# Patient Record
Sex: MALE | Race: WHITE | NOT HISPANIC OR LATINO | Employment: FULL TIME | URBAN - METROPOLITAN AREA
[De-identification: names, ages, dates, MRNs, and addresses within clinical notes are randomized per-mention and may not be internally consistent; named-entity substitution may affect disease eponyms.]

---

## 2020-02-14 ENCOUNTER — APPOINTMENT (OUTPATIENT)
Dept: CT IMAGING | Facility: HOSPITAL | Age: 35
End: 2020-02-14

## 2020-02-14 ENCOUNTER — HOSPITAL ENCOUNTER (EMERGENCY)
Facility: HOSPITAL | Age: 35
Discharge: HOME | End: 2020-02-14
Attending: EMERGENCY MEDICINE | Admitting: EMERGENCY MEDICINE

## 2020-02-14 VITALS
WEIGHT: 208 LBS | SYSTOLIC BLOOD PRESSURE: 120 MMHG | TEMPERATURE: 98.5 F | OXYGEN SATURATION: 97 % | DIASTOLIC BLOOD PRESSURE: 74 MMHG | RESPIRATION RATE: 16 BRPM | BODY MASS INDEX: 28.17 KG/M2 | HEIGHT: 72 IN | HEART RATE: 71 BPM

## 2020-02-14 DIAGNOSIS — K55.9 EPIPLOIC APPENDAGITIS DUE TO ISCHEMIA (HCC): Primary | ICD-10-CM

## 2020-02-14 DIAGNOSIS — K52.9 EPIPLOIC APPENDAGITIS DUE TO ISCHEMIA (HCC): Primary | ICD-10-CM

## 2020-02-14 LAB
ALBUMIN SERPL-MCNC: 4.3 G/DL (ref 3.5–5.2)
ALBUMIN/GLOB SERPL: 1.4 G/DL
ALP SERPL-CCNC: 76 U/L (ref 39–117)
ALT SERPL W P-5'-P-CCNC: 38 U/L (ref 1–41)
ANION GAP SERPL CALCULATED.3IONS-SCNC: 8 MMOL/L (ref 5–15)
AST SERPL-CCNC: 19 U/L (ref 1–40)
BASOPHILS # BLD AUTO: 0.03 10*3/MM3 (ref 0–0.2)
BASOPHILS NFR BLD AUTO: 0.4 % (ref 0–1.5)
BILIRUB SERPL-MCNC: 0.3 MG/DL (ref 0.2–1.2)
BILIRUB UR QL STRIP: NEGATIVE
BUN BLD-MCNC: 8 MG/DL (ref 6–20)
BUN/CREAT SERPL: 8.9 (ref 7–25)
CALCIUM SPEC-SCNC: 9.2 MG/DL (ref 8.6–10.5)
CHLORIDE SERPL-SCNC: 106 MMOL/L (ref 98–107)
CLARITY UR: CLEAR
CO2 SERPL-SCNC: 29 MMOL/L (ref 22–29)
COLOR UR: YELLOW
CREAT BLD-MCNC: 0.9 MG/DL (ref 0.76–1.27)
D-LACTATE SERPL-SCNC: 1 MMOL/L (ref 0.5–2)
DEPRECATED RDW RBC AUTO: 42.2 FL (ref 37–54)
EOSINOPHIL # BLD AUTO: 0.18 10*3/MM3 (ref 0–0.4)
EOSINOPHIL NFR BLD AUTO: 2.5 % (ref 0.3–6.2)
ERYTHROCYTE [DISTWIDTH] IN BLOOD BY AUTOMATED COUNT: 12.5 % (ref 12.3–15.4)
GFR SERPL CREATININE-BSD FRML MDRD: 98 ML/MIN/1.73
GLOBULIN UR ELPH-MCNC: 3.1 GM/DL
GLUCOSE BLD-MCNC: 101 MG/DL (ref 65–99)
GLUCOSE UR STRIP-MCNC: NEGATIVE MG/DL
HCT VFR BLD AUTO: 42.6 % (ref 37.5–51)
HGB BLD-MCNC: 14.7 G/DL (ref 13–17.7)
HGB UR QL STRIP.AUTO: NEGATIVE
HOLD SPECIMEN: NORMAL
HOLD SPECIMEN: NORMAL
IMM GRANULOCYTES # BLD AUTO: 0.02 10*3/MM3 (ref 0–0.05)
IMM GRANULOCYTES NFR BLD AUTO: 0.3 % (ref 0–0.5)
KETONES UR QL STRIP: NEGATIVE
LEUKOCYTE ESTERASE UR QL STRIP.AUTO: NEGATIVE
LIPASE SERPL-CCNC: 39 U/L (ref 13–60)
LYMPHOCYTES # BLD AUTO: 2.15 10*3/MM3 (ref 0.7–3.1)
LYMPHOCYTES NFR BLD AUTO: 29.4 % (ref 19.6–45.3)
MCH RBC QN AUTO: 31.9 PG (ref 26.6–33)
MCHC RBC AUTO-ENTMCNC: 34.5 G/DL (ref 31.5–35.7)
MCV RBC AUTO: 92.4 FL (ref 79–97)
MONOCYTES # BLD AUTO: 0.67 10*3/MM3 (ref 0.1–0.9)
MONOCYTES NFR BLD AUTO: 9.2 % (ref 5–12)
NEUTROPHILS # BLD AUTO: 4.27 10*3/MM3 (ref 1.7–7)
NEUTROPHILS NFR BLD AUTO: 58.2 % (ref 42.7–76)
NITRITE UR QL STRIP: NEGATIVE
NRBC BLD AUTO-RTO: 0 /100 WBC (ref 0–0.2)
PH UR STRIP.AUTO: 8 [PH] (ref 5–8)
PLATELET # BLD AUTO: 210 10*3/MM3 (ref 140–450)
PMV BLD AUTO: 10.5 FL (ref 6–12)
POTASSIUM BLD-SCNC: 4.6 MMOL/L (ref 3.5–5.2)
PROT SERPL-MCNC: 7.4 G/DL (ref 6–8.5)
PROT UR QL STRIP: NEGATIVE
RBC # BLD AUTO: 4.61 10*6/MM3 (ref 4.14–5.8)
SODIUM BLD-SCNC: 143 MMOL/L (ref 136–145)
SP GR UR STRIP: 1.01 (ref 1–1.03)
UROBILINOGEN UR QL STRIP: NORMAL
WBC NRBC COR # BLD: 7.32 10*3/MM3 (ref 3.4–10.8)
WHOLE BLOOD HOLD SPECIMEN: NORMAL
WHOLE BLOOD HOLD SPECIMEN: NORMAL

## 2020-02-14 PROCEDURE — 25010000002 IOPAMIDOL 61 % SOLUTION: Performed by: EMERGENCY MEDICINE

## 2020-02-14 PROCEDURE — 99284 EMERGENCY DEPT VISIT MOD MDM: CPT

## 2020-02-14 PROCEDURE — 83605 ASSAY OF LACTIC ACID: CPT | Performed by: PHYSICIAN ASSISTANT

## 2020-02-14 PROCEDURE — 85025 COMPLETE CBC W/AUTO DIFF WBC: CPT | Performed by: EMERGENCY MEDICINE

## 2020-02-14 PROCEDURE — 0 DIATRIZOATE MEGLUMINE & SODIUM PER 1 ML: Performed by: PHYSICIAN ASSISTANT

## 2020-02-14 PROCEDURE — 74177 CT ABD & PELVIS W/CONTRAST: CPT

## 2020-02-14 PROCEDURE — 83690 ASSAY OF LIPASE: CPT | Performed by: EMERGENCY MEDICINE

## 2020-02-14 PROCEDURE — 81003 URINALYSIS AUTO W/O SCOPE: CPT | Performed by: EMERGENCY MEDICINE

## 2020-02-14 PROCEDURE — 80053 COMPREHEN METABOLIC PANEL: CPT | Performed by: EMERGENCY MEDICINE

## 2020-02-14 RX ORDER — SODIUM CHLORIDE 0.9 % (FLUSH) 0.9 %
10 SYRINGE (ML) INJECTION AS NEEDED
Status: DISCONTINUED | OUTPATIENT
Start: 2020-02-14 | End: 2020-02-14 | Stop reason: HOSPADM

## 2020-02-14 RX ADMIN — IOPAMIDOL 95 ML: 612 INJECTION, SOLUTION INTRAVENOUS at 14:18

## 2020-02-14 RX ADMIN — SODIUM CHLORIDE 1000 ML: 9 INJECTION, SOLUTION INTRAVENOUS at 12:05

## 2020-02-14 RX ADMIN — DIATRIZOATE MEGLUMINE AND DIATRIZOATE SODIUM 15 ML: 660; 100 LIQUID ORAL; RECTAL at 12:28

## 2020-02-14 NOTE — DISCHARGE INSTRUCTIONS
Follow up with one of the [facility] Primary Care Providers below to setup primary care. If you need assistance coordinating a primary care appointment with a [facility] Primary Care Provider, please contact the Primary Care Coordinators at [phone] for appointment scheduling.    [facility], Primary Care   [address], [address]   [place], [place] [address] [phone]    [facility] Internal Medicine & Endocrinology  [address], [address]  [place] [address] [phone]    [facility] Family Medicine  [address], [address]   [place], [place] [address] [phone]    [facility] Primary Care  [address], [address]  [place], [place] [address] [phone]    [facility], Primary Care,   [address], [address]   [place], [place] [address] [phone]    [facility] Primary Care  [address], [address]  [place], [place] [address] [phone]    [facility], Primary Care  [address]  [place], [place] [address] [phone]    [facility] Internal Medicine & Pediatrics  [address], [address]   [place] [address] [phone]    [facility], Primary Care  [address] [place] [address] [phone]      [facility] Primary Care  [address], [address]   [place] [address] [phone]    [facility] Family Medicine  [address] [place] [address] [phone]  Follow up with one of the physician centers below to setup primary care.    [facility], [phone], [address], [address], [place], [facility]    [facility], [phone], [address]  [address]    [facility], [phone], [address] [place] [address];     [facility], [phone], [address] [place] [address]

## 2020-02-14 NOTE — ED PROVIDER NOTES
"Mirna Baez is a 34 y.o.male who presents to the emergency department with complaints of left flank pain. His pain is moderate in severity, constant in timing, and waxing and waning in progression. He states his sharp pain radiates to his suprapubic region and that it feels like \"gas pain\" and that it has lasted for two days. Mr. Baez reports his pain is worse with movement, deep breathing, and twisting. He denies symptoms of coughing, constipation, hematochezia, melena, or smoking. Mr. Baez took Tylenol, but reports no change in pain. He denies ever having a colonoscopy or having any digestive organs removed. He additionally denies history of Chrom's and is unaware of having a family history of diverticulitis. Mr. Baez works at [employer] as a . On Wednesday night, Mr. Baez recently went to a ED in [place] and received a CT scan, IV fluids, Toradol, and was diagnosed with Colitis. He has no past medical or surgical history. He uses smokeless tobacco and drinks alcohol on an occasional basis. He has no additional acute complaints at this time.       History provided by:  Patient  Flank Pain   Pain location:  L flank  Pain quality: sharp    Pain radiates to:  Suprapubic region  Pain severity:  Moderate  Onset quality:  Sudden  Duration:  2 days  Timing:  Constant  Progression:  Waxing and waning  Chronicity:  New  Worsened by:  Movement and deep breathing (Twisting)  Ineffective treatments:  Acetaminophen  Associated symptoms: no constipation, no cough, no hematochezia and no melena        Review of Systems   Respiratory: Negative for cough.    Gastrointestinal: Negative for constipation, hematochezia and melena.   Genitourinary: Positive for flank pain.   All other systems reviewed and are negative.      History reviewed. No pertinent past medical history.    No Known Allergies    History reviewed. No pertinent surgical history.    History reviewed. No pertinent family " history.    Social History     Socioeconomic History   • Marital status:      Spouse name: Not on file   • Number of children: Not on file   • Years of education: Not on file   • Highest education level: Not on file   Tobacco Use   • Smoking status: Never Smoker   • Smokeless tobacco: Current User     Types: Snuff   Substance and Sexual Activity   • Alcohol use: Yes     Frequency: Never     Comment: occasional   • Drug use: Never   • Sexual activity: Yes     Partners: Female         Objective   Physical Exam   Constitutional: He is oriented to person, place, and time. He appears well-developed and well-nourished. No distress.   HENT:   Head: Normocephalic and atraumatic.   Eyes: Conjunctivae are normal. No scleral icterus.   Neck: Normal range of motion. Neck supple.   Cardiovascular: Normal rate and regular rhythm.   No murmur heard.  Pulmonary/Chest: Effort normal and breath sounds normal. No respiratory distress.   Abdominal: Soft. There is tenderness. There is guarding. There is no rebound.   Point tenderness on left lateral abdomen towards ribs.   Musculoskeletal: Normal range of motion. He exhibits no edema.   Neurological: He is alert and oriented to person, place, and time.   Skin: Skin is warm and dry. No rash noted.   No legion or ecchymosis.   Psychiatric: He has a normal mood and affect. His behavior is normal.   Nursing note and vitals reviewed.      Procedures         ED Course  ED Course as of Feb 15 2313   Fri Feb 14, 2020   1223 Diony is at bedside consulting the patient.    [KR]   1451 Request # : [id number]  DEBBIE report was not available.  I believe the medical necessity for and safety in prescribing the controlled substance substantially outweighs the risk of unlawful use or diversion of the controlled substance.     [PORTER]      ED Course User Index  [PORTER] Joseph Luther PA  [KR] Beni Riley     No results found for this or any previous visit (from the past 24 hour(s)).  Note: In  addition to lab results from this visit, the labs listed above may include labs taken at another facility or during a different encounter within the last 24 hours. Please correlate lab times with ED admission and discharge times for further clarification of the services performed during this visit.    CT Abdomen Pelvis With Contrast   Final Result   Pericolonic stranding with a central fat attenuation   surrounded by ring of hyper attenuation adjacent to the anterior margin   descending colon without diverticular disease most consistent with   epiploic appendagitis.        D:  02/14/2020   E:  02/14/2020       This report was finalized on 2/14/2020 7:14 PM by Dr. Rashi Aaron.            Vitals:    02/14/20 1200 02/14/20 1220 02/14/20 1400 02/14/20 1507   BP: 130/81 129/80 122/71 120/74   Pulse:       Resp:       Temp:       TempSrc:       SpO2: 97% 96% 97% 97%   Weight:       Height:         Medications   sodium chloride 0.9 % bolus 1,000 mL (0 mL Intravenous Stopped 2/14/20 1235)   diatrizoate meglumine-sodium (GASTROGRAFIN) 66-10 % solution 15 mL (15 mL Oral Given 2/14/20 1228)   iopamidol (ISOVUE-300) 61 % injection 100 mL (95 mL Intravenous Given 2/14/20 1418)     ECG/EMG Results (last 24 hours)     ** No results found for the last 24 hours. **        No orders to display                     [place] Coma Scale Score: 15                            MDM  Number of Diagnoses or Management Options  Epiploic appendagitis due to ischemia: new and requires workup     Amount and/or Complexity of Data Reviewed  Clinical lab tests: reviewed and ordered  Tests in the radiology section of CPT®: reviewed and ordered  Discuss the patient with other providers: yes    Patient Progress  Patient progress: stable      Final diagnoses:   Epiploic appendagitis due to ischemia       Documentation assistance provided by holly Riley.  Information recorded by the holly was done at my direction and has been verified and  validated by me.     Beni Riley  02/14/20 1214       Joseph Luthre PA  02/15/20 4324

## 2021-02-04 ENCOUNTER — TREATMENT (OUTPATIENT)
Dept: PHYSICAL THERAPY | Facility: CLINIC | Age: 36
End: 2021-02-04

## 2021-02-04 ENCOUNTER — TRANSCRIBE ORDERS (OUTPATIENT)
Dept: PHYSICAL THERAPY | Facility: CLINIC | Age: 36
End: 2021-02-04

## 2021-02-04 DIAGNOSIS — M54.6 PAIN IN THORACIC SPINE: Primary | ICD-10-CM

## 2021-02-04 DIAGNOSIS — S29.019A STRAIN OF THORACIC REGION, INITIAL ENCOUNTER: Primary | ICD-10-CM

## 2021-02-04 PROCEDURE — 97161 PT EVAL LOW COMPLEX 20 MIN: CPT | Performed by: PHYSICAL THERAPIST

## 2021-02-04 PROCEDURE — 97110 THERAPEUTIC EXERCISES: CPT | Performed by: PHYSICAL THERAPIST

## 2021-02-04 PROCEDURE — 97140 MANUAL THERAPY 1/> REGIONS: CPT | Performed by: PHYSICAL THERAPIST

## 2021-02-04 NOTE — PROGRESS NOTES
Physical Therapy Initial Evaluation and Plan of Care      Patient: Rafa Baze   : 1985  Diagnosis/ICD-10 Code:  No primary diagnosis found.  Referring practitioner: Jos Al MD    Subjective Evaluation    History of Present Illness  Date of onset: 2021  Mechanism of injury: Stretching back into extension    Subjective comment: Back locked up while stretching at work in the mid thoracic area.  Had difficulty lifting his arms and moving his trunk.  [place] much better and tried to get back to lifting boxes.  Back got reaggravated and had to stop lifting boxes again.  Denies numbness and tingling in his back and arms.  Denies difficultys sleeping.  Patient Occupation: ->[employer]   Precautions and Work Restrictions: Driving onlyQuality of life: excellent    Pain  Alleviating factors: Ibruprofen; TENS; biofreeze; liodcaine patches.  Exacerbated by: left/right side bending.    Social Support  Lives with: spouse and young children ( 4 kids)    Hand dominance: right    Treatments  Previous treatment: medication (mm relaxer; oral steroid)  Patient Goals  Patient goals for therapy: decreased pain and increased motion             Objective          Active Range of Motion   Cervical/Thoracic Spine     Thoracic   Flexion: WFL  Extension: WFL  Left rotation: 50 (pinching in L lower T/S region) degrees   Right rotation: 60 (pulling in L lower T/S region) degrees     Additional Active Range of Motion Details  GHJ flx:  170 deg B/L  GHJ TED:  WFL B/L  GHJ HBB:  WFL B/L    Strength/Myotome Testing     Additional Strength Details  GHJ scaption MMT:  5/5 B/L  GHJ ER MMT:  5/5 B/L               Assessment & Plan     Assessment  Impairments: abnormal or restricted ROM, lacks appropriate home exercise program, pain with function and safety issue  Assessment details: Mr. Baez is a 35 year old male that presents to physical therapy s/p work related injury on 21 resulting in a mid to lower T/S  injury while stretching.  PMH is unremarkable. No previous history of back pain or injury.  GHJ and C/S cleared.  T/S mobility is limited into rotation bilaterally secondary to soreness in L lower costovertebral junction.  Has signs and symptoms of a rib 9-10 dysfunction that will do very well with thrust manipulation and mobility exercises to allow a pain-free return to premorbid work duties.  Prognosis: good  Functional Limitations: carrying objects, lifting, pulling, pushing and unable to perform repetitive tasks  Goals  Plan Goals: STGs:  1.)  Self report at least 50% improvement in symptoms in 2 weeks.  LTGs:  2.)  Have full trunk rotation at 70 deg bilaterally without symptoms of pulling, catching or pain in 4 weeks.  3.)  Return to all premorbid work related tasks without pain or functional limitation in 4 weeks.    Plan  Therapy options: will be seen for skilled physical therapy services  Planned modality interventions: thermotherapy (hydrocollator packs)  Planned therapy interventions: stretching, strengthening, manual therapy, abdominal trunk stabilization, functional ROM exercises and home exercise program  Frequency: 2x week  Duration in visits: 12  Duration in weeks: 6  Treatment plan discussed with: patient        Manual Therapy:    8     mins  26360;  Therapeutic Exercise:    10     mins  68642;     Neuromuscular Rossi:        mins  10763;    Therapeutic Activity:          mins  76467;     Gait Training:           mins  58572;     Ultrasound:          mins  36487;    Electrical Stimulation:         mins  43743 ( );  Dry Needling          mins self-pay    Timed Treatment:   18   mins   Total Treatment:     45   mins    PT SIGNATURE: Ilya Damon, PT   DATE TREATMENT INITIATED: 2/4/2021    Initial Certification  Certification Period: 5/5/2021  I certify that the therapy services are furnished while this patient is under my care.  The services outlined above are required by this patient, and  will be reviewed every 90 days.     PHYSICIAN: Jos Al MD      DATE:     Please sign and return via fax to [phone].. Thank you, [facility] Physical Therapy.

## 2021-02-09 ENCOUNTER — TREATMENT (OUTPATIENT)
Dept: PHYSICAL THERAPY | Facility: CLINIC | Age: 36
End: 2021-02-09

## 2021-02-09 DIAGNOSIS — M54.6 PAIN IN THORACIC SPINE: Primary | ICD-10-CM

## 2021-02-09 PROCEDURE — 97530 THERAPEUTIC ACTIVITIES: CPT | Performed by: PHYSICAL THERAPIST

## 2021-02-09 PROCEDURE — 97140 MANUAL THERAPY 1/> REGIONS: CPT | Performed by: PHYSICAL THERAPIST

## 2021-02-09 PROCEDURE — 97112 NEUROMUSCULAR REEDUCATION: CPT | Performed by: PHYSICAL THERAPIST

## 2021-02-09 PROCEDURE — 97110 THERAPEUTIC EXERCISES: CPT | Performed by: PHYSICAL THERAPIST

## 2021-02-09 NOTE — PROGRESS NOTES
Physical Therapy Daily Progress Note    Patient: Rafa Baez   : 1985  Diagnosis/ICD-10 Code:  Pain in thoracic spine [M54.6]  Referring practitioner: Jos Al MD  Date of Initial Visit: Type: THERAPY  Noted: 2021  Today's Date: 2021  Patient seen for 2 sessions         Rafa Baez reports that he no longer has catching in his back, but still sharp pain.  Has to take muscle relaxers to sleep at this time.  Has been more sore after last visit, from the exercises.      Subjective     Objective   See Exercise, Manual, and Modality Logs for complete treatment.       Assessment/Plan   Continued emphasis on reducing thoracic stiffness, improving trunk strength and general endurance.         Manual Therapy:    9     mins  13278;  Therapeutic Exercise:    18     mins  09015;     Neuromuscular Rossi:   8    mins  78623;    Therapeutic Activity:     10     mins  50938;     Gait Training:           mins  62147;     Ultrasound:          mins  45504;    Electrical Stimulation:         mins  38840 ( );  Dry Needling          mins self-pay    Timed Treatment:   45   mins   Total Treatment:     45   mins    Ilya Damon PT  Physical Therapist

## 2021-02-18 ENCOUNTER — TREATMENT (OUTPATIENT)
Dept: PHYSICAL THERAPY | Facility: CLINIC | Age: 36
End: 2021-02-18

## 2021-02-18 DIAGNOSIS — M54.6 PAIN IN THORACIC SPINE: Primary | ICD-10-CM

## 2021-02-18 PROCEDURE — 97530 THERAPEUTIC ACTIVITIES: CPT | Performed by: PHYSICAL THERAPIST

## 2021-02-18 PROCEDURE — 97110 THERAPEUTIC EXERCISES: CPT | Performed by: PHYSICAL THERAPIST

## 2021-02-18 PROCEDURE — 97112 NEUROMUSCULAR REEDUCATION: CPT | Performed by: PHYSICAL THERAPIST

## 2021-02-18 PROCEDURE — 97140 MANUAL THERAPY 1/> REGIONS: CPT | Performed by: PHYSICAL THERAPIST

## 2021-02-18 NOTE — PROGRESS NOTES
Physical Therapy Daily Progress Note    Patient: Rafa Baez   : 1985  Diagnosis/ICD-10 Code:  Pain in thoracic spine [M54.6]  Referring practitioner: Jos Al MD  Date of Initial Visit: Type: THERAPY  Noted: 2021  Today's Date: 2021  Patient seen for 3 sessions         Rafa Baez reports feeling no symptoms  of last weekend.  Was cleared to lift up to 45 lbs at work.  This aggravated his symptoms considerably.  Took a muscle relaxer last night to sleep.  Feels better today, but still has stiffness and soreness in his mid back.    Subjective     Objective   See Exercise, Manual, and Modality Logs for complete treatment.       Assessment/Plan  Continued emphasis on reducing trunk stiffness and improving mm activation/intra-abdominal pressure while performing various trunk mobility exercises.  Expect a full recovery with return to work without restrictions in 2 weeks.    HEP:  -long lever trunk rotation in quadruped  -B/L GHJ ER with trunk rotation  -Standing trunk flx with rotation           Manual Therapy:    10     mins  37099;  Therapeutic Exercise:    12     mins  45414;     Neuromuscular Rossi:    9    mins  45354;    Therapeutic Activity:     12     mins  42607;     Gait Training:           mins  54070;     Ultrasound:          mins  59798;    Electrical Stimulation:         mins  78080 ( );  Dry Needling          mins self-pay    Timed Treatment:   43   mins   Total Treatment:     43   mins    Ilya Damon PT  Physical Therapist

## 2021-02-23 ENCOUNTER — TREATMENT (OUTPATIENT)
Dept: PHYSICAL THERAPY | Facility: CLINIC | Age: 36
End: 2021-02-23

## 2021-02-23 DIAGNOSIS — M54.6 PAIN IN THORACIC SPINE: Primary | ICD-10-CM

## 2021-02-23 PROCEDURE — 97140 MANUAL THERAPY 1/> REGIONS: CPT | Performed by: PHYSICAL THERAPIST

## 2021-02-23 PROCEDURE — 97530 THERAPEUTIC ACTIVITIES: CPT | Performed by: PHYSICAL THERAPIST

## 2021-02-23 PROCEDURE — 97110 THERAPEUTIC EXERCISES: CPT | Performed by: PHYSICAL THERAPIST

## 2021-02-23 PROCEDURE — 97112 NEUROMUSCULAR REEDUCATION: CPT | Performed by: PHYSICAL THERAPIST

## 2021-02-23 NOTE — PROGRESS NOTES
Physical Therapy Daily Progress Note    Patient: Rafa Baez   : 1985  Diagnosis/ICD-10 Code:  Pain in thoracic spine [M54.6]  Referring practitioner: Jos Al MD  Date of Initial Visit: Type: THERAPY  Noted: 2021  Today's Date: 2021  Patient seen for 4 sessions         Rafa Baez reports feeling sore for about 3 days after his last visit.  Took a muscle relaxer last night to sleep.  Feels good today.  Just has some stiffness.  Denies soreness.  Reports that he feels like his back lacks stamina as the work week progresses.  Is only lifting 10 lbs max at work.  Decreasing need of use of muscle relaxers since visit.      Subjective     Objective   See Exercise, Manual, and Modality Logs for complete treatment.       Assessment/Plan  Focused visit on reducing mid T/S and adjacent rib stiffness via manual techniques.  Combined with exercises to improve posterior and anterior trunk mm endurance.    Provided written and verbal instruction for updated HEP in terms of dosage and rationale.  (billing included in TE time below)       Manual Therapy:    9     mins  60658;  Therapeutic Exercise:    15     mins  70400;     Neuromuscular Rossi:    9    mins  40709;    Therapeutic Activity:     12     mins  67926;     Gait Training:           mins  75545;     Ultrasound:          mins  79901;    Electrical Stimulation:         mins  05312 ( );  Dry Needling          mins self-pay    Timed Treatment:   45   mins   Total Treatment:     45   mins    Ilya Damon PT  Physical Therapist

## 2021-02-25 ENCOUNTER — TREATMENT (OUTPATIENT)
Dept: PHYSICAL THERAPY | Facility: CLINIC | Age: 36
End: 2021-02-25

## 2021-02-25 DIAGNOSIS — M54.6 PAIN IN THORACIC SPINE: Primary | ICD-10-CM

## 2021-02-25 PROCEDURE — 97530 THERAPEUTIC ACTIVITIES: CPT | Performed by: PHYSICAL THERAPIST

## 2021-02-25 PROCEDURE — 97110 THERAPEUTIC EXERCISES: CPT | Performed by: PHYSICAL THERAPIST

## 2021-02-25 PROCEDURE — 97112 NEUROMUSCULAR REEDUCATION: CPT | Performed by: PHYSICAL THERAPIST

## 2021-02-25 NOTE — PROGRESS NOTES
Physical Therapy Daily Progress Note    Patient: Rafa Baez   : 1985  Diagnosis/ICD-10 Code:  No primary diagnosis found.  Referring practitioner: Jos Al MD  Date of Initial Visit: No linked episodes  Today's Date: 2021  Patient seen for Visit count could not be calculated. Make sure you are using a visit which is associated with an episode. sessions         Rafa Baez reports that he is stiff but not sore.  Only feeling a pinch in the left rib when rotating his body left and right.  Reports that he has been sleeping well the past 2 nights without a muscle relaxer.    Subjective     Objective   See Exercise, Manual, and Modality Logs for complete treatment.       Assessment/Plan  Focused visit on improving trunk strength, functional rotation and general balance/stability.  Will f/u with client next week to see how lifting went at work over the next few days.             Manual Therapy:         mins  24198;  Therapeutic Exercise:    24     mins  44541;     Neuromuscular Rossi:    8    mins  71533;    Therapeutic Activity:     12     mins  94425;     Gait Training:           mins  89533;     Ultrasound:          mins  44706;    Electrical Stimulation:         mins  20626 ( );  Dry Needling          mins self-pay    Timed Treatment:   44   mins   Total Treatment:     44   mins    Ilya Damon PT  Physical Therapist

## 2021-03-02 ENCOUNTER — TREATMENT (OUTPATIENT)
Dept: PHYSICAL THERAPY | Facility: CLINIC | Age: 36
End: 2021-03-02

## 2021-03-02 DIAGNOSIS — M53.84 THORACIC SPINE DYSFUNCTION: Primary | ICD-10-CM

## 2021-03-02 PROCEDURE — 97530 THERAPEUTIC ACTIVITIES: CPT | Performed by: PHYSICAL THERAPIST

## 2021-03-02 PROCEDURE — 97110 THERAPEUTIC EXERCISES: CPT | Performed by: PHYSICAL THERAPIST

## 2021-03-02 PROCEDURE — 97112 NEUROMUSCULAR REEDUCATION: CPT | Performed by: PHYSICAL THERAPIST

## 2021-03-02 NOTE — PROGRESS NOTES
Physical Therapy Daily Progress Note    Patient: Rafa Baez   : 1985  Diagnosis/ICD-10 Code:  No primary diagnosis found.  Referring practitioner: Jos Al MD  Date of Initial Visit: No linked episodes  Today's Date: 3/2/2021  Patient seen for Visit count could not be calculated. Make sure you are using a visit which is associated with an episode. sessions         Rafa Baez reports mainly having stiffness.  States that he has a very little pinch with fully rotating to the left.  Did a lot of work yesterday and felt good during his shift.  States that he feels much better overall.  No need for use of muscle relaxers in 3-4 days.  Sleep is good now.      Subjective     Objective   See Exercise, Manual, and Modality Logs for complete treatment.       Assessment/Plan  Focused visit on reducing T/S stiffness, improving trunk rotation under load and general scapular mm endurance.  Will f/u with client one more visit to assure he is doing well with all work-related tasks.         Manual Therapy:         mins  99443;  Therapeutic Exercise:    12     mins  32933;     Neuromuscular Rossi:    9    mins  44138;    Therapeutic Activity:     15     mins  17701;     Gait Training:           mins  09191;     Ultrasound:          mins  37558;    Electrical Stimulation:         mins  52799 ( );  Dry Needling          mins self-pay    Timed Treatment:   36   mins   Total Treatment:     36 mins    Ilya Damon PT  Physical Therapist

## 2021-05-14 ENCOUNTER — TRANSCRIBE ORDERS (OUTPATIENT)
Dept: PHYSICAL THERAPY | Facility: CLINIC | Age: 36
End: 2021-05-14

## 2021-05-14 DIAGNOSIS — S29.012A STRAIN OF THORACIC BACK REGION: Primary | ICD-10-CM

## 2021-05-28 ENCOUNTER — TREATMENT (OUTPATIENT)
Dept: PHYSICAL THERAPY | Facility: CLINIC | Age: 36
End: 2021-05-28

## 2021-05-28 DIAGNOSIS — M54.6 PAIN IN THORACIC SPINE: Primary | ICD-10-CM

## 2021-05-28 PROCEDURE — 97164 PT RE-EVAL EST PLAN CARE: CPT | Performed by: PHYSICAL THERAPIST

## 2021-05-28 PROCEDURE — 97140 MANUAL THERAPY 1/> REGIONS: CPT | Performed by: PHYSICAL THERAPIST

## 2021-05-28 PROCEDURE — 97110 THERAPEUTIC EXERCISES: CPT | Performed by: PHYSICAL THERAPIST

## 2021-05-28 NOTE — PROGRESS NOTES
Physical Therapy Daily Progress Note/ Re-evaluation    TOTAL TIME:  70 MINUTES    Rafa Baez reports: woke up a couple of weeks ago and felt the same pain- states he woke up in an awkward position, twisted ; had some x rays that were negative; got a massage yesterday- didn't help at all; feels stiff and won't loosen up; now I feel like the whole back is stiff , hamstrings get really tight ; limited with ADL's  Working on light duty, only doing very light packages  F/u with occ med in a couple of weeks   Pain is constant 2/10; patient feels pain is not affected by posture, 'whether I sit with good posture or bad posture, when I sit for too long the pain gets worse'  Does tens unit ~ 2x per day  Uses some lidocaine patches by icy hot     Pain with rotation, mainly to the left ; feels good to stretch to the right with rotation   Is performing stretching routine at home from previous PT sessions  Feels better after stretching, just does not last, tightness comes back    Objective   See Exercise, Manual, and Modality Logs for complete treatment.     ROM: full ROM of T/L spine today, normal mm tightness at end range, no pain    Palpation: no pain with palpation, but location of pain throughout day is ~ T9-11 left sided, no tenderness to spinous processes    THERAPEUTIC EXERCISES/ACTIVITIES ADDED TODAY: see below     Access Code: [email or web address]  URL: [email or web address]  Date: 05/28/2021  Prepared by: Mike Gary    Exercises  Supine Thoracic Extension on Swiss Ball - 3 x daily - 7 x weekly - 1 sets - 10 reps  Child's Pose Stretch - 3 x daily - 7 x weekly - 1 sets - 10 reps  Child's Pose with Sidebending - 3 x daily - 7 x weekly - 1 sets - 10 reps  Child's Pose with Thread the Needle - 3 x daily - 7 x weekly - 1 sets - 10 reps  Downward Dog - 3 x daily - 7 x weekly - 1 sets - 5 reps - 10 hold  Standing Quadratus Lumborum Stretch with Doorway - 3 x daily - 7 x weekly - 1 sets - 5 reps - 10 hold  TL Sidebending  Stretch - Arms Overhead - 3 x daily - 7 x weekly - 1 sets - 5 reps - 10 hold  Supine Hamstring Stretch with Doorway - 3 x daily - 7 x weekly - 1 sets - 5 reps - 10 hold  Doorway Rhomboid Stretch - 3 x daily - 7 x weekly - 1 sets - 5 reps - 10 hold    Moist heat with IFC estim to left p/s mm thoracic spine x 15 minutes     Manual: thoracic STM, deep sustained pressure to tender/trigger points in rhomboid mm, mid traps left side     Thoracic mobilizations into extension, seated on edge of table / thoracic PA's in prone    Assessment/Plan  Patient presents to PT with similar pain / symptoms that he had in Feb/March; was last seen on 3/2/21 and was doing better at that time; c/o primarily tight mm that 'can't get stretched out' ; he responded well to today's treatment, was pleased with full, pain-free ROM at end of session; PT for manual therapy, therapeutic exercise, taping, modalities prn for relief of pain and improved mobility    Progress per Plan of Care and Progress strengthening /stabilization /functional activity           Manual Therapy:    25     mins  11068;  Therapeutic Exercise:    10     mins  00972;     Neuromuscular Rossi:        mins  11077;    Therapeutic Activity:          mins  33861;     Gait Training:           mins  86003;     Ultrasound:          mins  23223;    Electrical Stimulation:    15     mins  69251 ( );  Dry Needling          mins self-pay    Timed Treatment:   50   mins   Total Treatment:     70   mins    TRAVIS Gary, PT  Physical Therapist

## 2021-06-02 ENCOUNTER — TREATMENT (OUTPATIENT)
Dept: PHYSICAL THERAPY | Facility: CLINIC | Age: 36
End: 2021-06-02

## 2021-06-02 DIAGNOSIS — M54.6 PAIN IN THORACIC SPINE: Primary | ICD-10-CM

## 2021-06-02 PROCEDURE — 97140 MANUAL THERAPY 1/> REGIONS: CPT | Performed by: PHYSICAL THERAPIST

## 2021-06-02 PROCEDURE — 97014 ELECTRIC STIMULATION THERAPY: CPT | Performed by: PHYSICAL THERAPIST

## 2021-06-02 PROCEDURE — 97110 THERAPEUTIC EXERCISES: CPT | Performed by: PHYSICAL THERAPIST

## 2021-06-02 NOTE — PROGRESS NOTES
Physical Therapy Daily Progress Note    TOTAL TIME: 55 MINUTES    Rafa Baez reports: felt a lot better after the last session, no pain last couple of days      Objective   See Exercise, Manual, and Modality Logs for complete treatment.     THERAPEUTIC EXERCISES/ACTIVITIES ADDED TODAY: see hep    Manual: STM to left periscapular mm, rhomboids, lower trapezius, mobilization to CT jxn in seated position x 25 minutes     IFC with moist heat to left periscapular mm x 15 minutes    Assessment/Plan  PATIENT NEEDS CONTINUED PHYSICAL THERAPY IN ORDER TO ACHIEVE FULL ROM, STRENGTH AND FUNCTION WITH NO REPORTS OF PAIN IN ORDER TO RTW WITHOUT RESTRICTIONS AND WITHOUT PAIN OR DYSFUNCTION       Progress per Plan of Care           Manual Therapy:    25     mins  03473;  Therapeutic Exercise:    15     mins  38755;     Neuromuscular Rossi:        mins  54787;    Therapeutic Activity:          mins  08507;     Gait Training:           mins  81368;     Ultrasound:          mins  42372;    Electrical Stimulation:    15     mins  79012 ( );  Dry Needling          mins self-pay    Timed Treatment:   55   mins   Total Treatment:     55   mins    TRAVIS Gary PT  Physical Therapist

## 2021-06-08 ENCOUNTER — TREATMENT (OUTPATIENT)
Dept: PHYSICAL THERAPY | Facility: CLINIC | Age: 36
End: 2021-06-08

## 2021-06-08 DIAGNOSIS — M54.6 PAIN IN THORACIC SPINE: Primary | ICD-10-CM

## 2021-06-08 PROCEDURE — 97112 NEUROMUSCULAR REEDUCATION: CPT | Performed by: PHYSICAL THERAPIST

## 2021-06-08 PROCEDURE — 97110 THERAPEUTIC EXERCISES: CPT | Performed by: PHYSICAL THERAPIST

## 2021-06-08 PROCEDURE — 97140 MANUAL THERAPY 1/> REGIONS: CPT | Performed by: PHYSICAL THERAPIST

## 2021-06-08 PROCEDURE — 97014 ELECTRIC STIMULATION THERAPY: CPT | Performed by: PHYSICAL THERAPIST

## 2021-06-08 NOTE — PROGRESS NOTES
Physical Therapy Daily Progress Note    TOTAL TIME: 55 MINUTES    Rafa Baez reports: feels much better; don't really have any pain, just that one tiny spot that feels tight      Objective   See Exercise, Manual, and Modality Logs for complete treatment.     THERAPEUTIC EXERCISES/ACTIVITIES ADDED TODAY: open book thoracic stx, TRX thoracic/lat stx bilateral and unilateral     Manual: STM to left periscapular mm, rhomboids, middle trap; thoracic mobilizations in seated position x 10 minutes    Moist heat with estim to left periscapular mm x 15 minutes     Assessment/Plan  PATIENT NEEDS CONTINUED PHYSICAL THERAPY IN ORDER TO ACHIEVE FULL ROM, STRENGTH AND FUNCTION WITH NO REPORTS OF PAIN IN ORDER TO RTW WITHOUT RESTRICTIONS AND WITHOUT PAIN OR DYSFUNCTION ; INSTRUCTED PATIENT THAT CONTINUAL SELF SOFT-TISSUE WORK, MOBILIZATION, STRETCHING ETC WILL BE NECESSARY TO HAVE A GOOD CHANCE AT DECREASING LIKELIHOOD OF CONTINUE THORACIC TIGHTNESS WHILE WORKING, LIFTING/CARRYING HEAVY BOXES; ERGONOMIC EDUCATION FOR LIFTING/CARRYING HEAVY OR AWKWARD BOXES CLOSE TO BODY       Progress per Plan of Care and Progress strengthening /stabilization /functional activity           Manual Therapy:    10     mins  56445;  Therapeutic Exercise:    15     mins  02566;     Neuromuscular Rossi:    15    mins  05365;    Therapeutic Activity:          mins  39744;     Gait Training:           mins  50265;     Ultrasound:          mins  34795;    Electrical Stimulation:    15     mins  36544 ( );  Dry Needling          mins self-pay    Timed Treatment:   55   mins   Total Treatment:     55   mins    TRAVIS Gary PT  Physical Therapist

## 2021-06-10 ENCOUNTER — TREATMENT (OUTPATIENT)
Dept: PHYSICAL THERAPY | Facility: CLINIC | Age: 36
End: 2021-06-10

## 2021-06-10 DIAGNOSIS — M54.6 PAIN IN THORACIC SPINE: Primary | ICD-10-CM

## 2021-06-10 PROCEDURE — 97112 NEUROMUSCULAR REEDUCATION: CPT | Performed by: PHYSICAL THERAPIST

## 2021-06-10 PROCEDURE — 97140 MANUAL THERAPY 1/> REGIONS: CPT | Performed by: PHYSICAL THERAPIST

## 2021-06-10 PROCEDURE — 97014 ELECTRIC STIMULATION THERAPY: CPT | Performed by: PHYSICAL THERAPIST

## 2021-06-10 PROCEDURE — 97110 THERAPEUTIC EXERCISES: CPT | Performed by: PHYSICAL THERAPIST

## 2021-06-10 NOTE — PROGRESS NOTES
Physical Therapy Daily Progress Note    TOTAL TIME: 60 MINUTES    Rafa Baez reports: patient states he actually worked 9 1/2 hours yesterday, but did not have to carry of lift anything heavy; feels stiff this am but no pain      Objective   See Exercise, Manual, and Modality Logs for complete treatment.     THERAPEUTIC EXERCISES/ACTIVITIES ADDED TODAY: TG hangs, TRX thoracic mobilizations    Manual: thoracic mobilizations, STM to left periscapular mm x 15 minutes    Assessment/Plan  PATIENT NEEDS CONTINUED PHYSICAL THERAPY IN ORDER TO ACHIEVE FULL ROM, STRENGTH AND FUNCTION WITH NO REPORTS OF PAIN IN ORDER TO RTW WITHOUT RESTRICTIONS AND WITHOUT PAIN OR DYSFUNCTION       Progress per Plan of Care           Manual Therapy:    15     mins  82026;  Therapeutic Exercise:    15     mins  13309;     Neuromuscular Rossi:    15    mins  54905;    Therapeutic Activity:          mins  70443;     Gait Training:           mins  86906;     Ultrasound:          mins  12979;    Electrical Stimulation:    15     mins  27153 ( );  Dry Needling          mins self-pay    Timed Treatment:   60   mins   Total Treatment:     60   mins    TRAVIS Gary PT  Physical Therapist

## 2021-06-16 ENCOUNTER — TREATMENT (OUTPATIENT)
Dept: PHYSICAL THERAPY | Facility: CLINIC | Age: 36
End: 2021-06-16

## 2021-06-16 DIAGNOSIS — M54.6 PAIN IN THORACIC SPINE: Primary | ICD-10-CM

## 2021-06-16 PROCEDURE — 97535 SELF CARE MNGMENT TRAINING: CPT | Performed by: PHYSICAL THERAPIST

## 2021-06-16 PROCEDURE — 97140 MANUAL THERAPY 1/> REGIONS: CPT | Performed by: PHYSICAL THERAPIST

## 2021-06-16 PROCEDURE — 97014 ELECTRIC STIMULATION THERAPY: CPT | Performed by: PHYSICAL THERAPIST

## 2021-06-16 NOTE — PROGRESS NOTES
Physical Therapy Daily Progress Note/ Discharge Summary    TOTAL TIME: 35 MINUTES    Rafa Hudsontner reports: feels 100%; seeing MD after this, hoping for full release; no tender spots, using thera gun, thera cane, tens unit at home to keep soft tissue restrictions away      Objective   See Exercise, Manual, and Modality Logs for complete treatment.     THERAPEUTIC EXERCISES/ACTIVITIES ADDED TODAY: see flow sheets   IFC with moist heat to left periscapular mm x 15 minutes    Assessment/Plan  Patient has made very good progress and is pain-free; ready to RTW with all goals met    Other discharge  Self care/home mgmt: extensive education regarding maintenance of soft tissue mobility, posture, ergonomics, self-treat techniques       Self care home mgtm   10 mins    [id number]  Manual Therapy:    10     mins  90129;  Therapeutic Exercise:         mins  24188;     Neuromuscular Rossi:        mins  62420;    Therapeutic Activity:          mins  45242;     Gait Training:           mins  10681;     Ultrasound:          mins  26577;    Electrical Stimulation:    15     mins  36006 ( );  Dry Needling          mins self-pay    Timed Treatment:   35   mins   Total Treatment:     35   mins    TRAVIS Gary PT  Physical Therapist

## 2022-08-09 ENCOUNTER — HOSPITAL ENCOUNTER (EMERGENCY)
Facility: HOSPITAL | Age: 37
Discharge: HOME | End: 2022-08-09
Attending: EMERGENCY MEDICINE | Admitting: EMERGENCY MEDICINE

## 2022-08-09 ENCOUNTER — APPOINTMENT (OUTPATIENT)
Dept: CT IMAGING | Facility: HOSPITAL | Age: 37
End: 2022-08-09

## 2022-08-09 VITALS
HEIGHT: 72 IN | BODY MASS INDEX: 30.2 KG/M2 | RESPIRATION RATE: 18 BRPM | DIASTOLIC BLOOD PRESSURE: 72 MMHG | OXYGEN SATURATION: 94 % | TEMPERATURE: 98.8 F | SYSTOLIC BLOOD PRESSURE: 129 MMHG | HEART RATE: 75 BPM | WEIGHT: 223 LBS

## 2022-08-09 DIAGNOSIS — R51.9 NONINTRACTABLE HEADACHE, UNSPECIFIED CHRONICITY PATTERN, UNSPECIFIED HEADACHE TYPE: Primary | ICD-10-CM

## 2022-08-09 LAB
FLUAV RNA RESP QL NAA+PROBE: NOT DETECTED
FLUBV RNA RESP QL NAA+PROBE: NOT DETECTED
HOLD SPECIMEN: NORMAL
SARS-COV-2 RNA RESP QL NAA+PROBE: NOT DETECTED
WHOLE BLOOD HOLD COAG: NORMAL
WHOLE BLOOD HOLD SPECIMEN: NORMAL

## 2022-08-09 PROCEDURE — 70450 CT HEAD/BRAIN W/O DYE: CPT

## 2022-08-09 PROCEDURE — 25010000002 PROCHLORPERAZINE 10 MG/2ML SOLUTION: Performed by: NURSE PRACTITIONER

## 2022-08-09 PROCEDURE — 87636 SARSCOV2 & INF A&B AMP PRB: CPT | Performed by: NURSE PRACTITIONER

## 2022-08-09 PROCEDURE — 96374 THER/PROPH/DIAG INJ IV PUSH: CPT

## 2022-08-09 PROCEDURE — 25010000002 DIPHENHYDRAMINE PER 50 MG: Performed by: NURSE PRACTITIONER

## 2022-08-09 PROCEDURE — 25010000002 KETOROLAC TROMETHAMINE PER 15 MG: Performed by: NURSE PRACTITIONER

## 2022-08-09 PROCEDURE — 99283 EMERGENCY DEPT VISIT LOW MDM: CPT

## 2022-08-09 PROCEDURE — 96375 TX/PRO/DX INJ NEW DRUG ADDON: CPT

## 2022-08-09 RX ORDER — DIPHENHYDRAMINE HYDROCHLORIDE 50 MG/ML
25 INJECTION INTRAMUSCULAR; INTRAVENOUS ONCE
Status: COMPLETED | OUTPATIENT
Start: 2022-08-09 | End: 2022-08-09

## 2022-08-09 RX ORDER — SODIUM CHLORIDE 0.9 % (FLUSH) 0.9 %
10 SYRINGE (ML) INJECTION AS NEEDED
Status: DISCONTINUED | OUTPATIENT
Start: 2022-08-09 | End: 2022-08-09

## 2022-08-09 RX ORDER — SODIUM CHLORIDE 0.9 % (FLUSH) 0.9 %
10 SYRINGE (ML) INJECTION AS NEEDED
Status: DISCONTINUED | OUTPATIENT
Start: 2022-08-09 | End: 2022-08-09 | Stop reason: HOSPADM

## 2022-08-09 RX ORDER — KETOROLAC TROMETHAMINE 15 MG/ML
15 INJECTION, SOLUTION INTRAMUSCULAR; INTRAVENOUS ONCE
Status: COMPLETED | OUTPATIENT
Start: 2022-08-09 | End: 2022-08-09

## 2022-08-09 RX ORDER — PROCHLORPERAZINE EDISYLATE 5 MG/ML
5 INJECTION INTRAMUSCULAR; INTRAVENOUS ONCE
Status: COMPLETED | OUTPATIENT
Start: 2022-08-09 | End: 2022-08-09

## 2022-08-09 RX ADMIN — SODIUM CHLORIDE 1000 ML: 9 INJECTION, SOLUTION INTRAVENOUS at 10:28

## 2022-08-09 RX ADMIN — KETOROLAC TROMETHAMINE 15 MG: 15 INJECTION, SOLUTION INTRAMUSCULAR; INTRAVENOUS at 10:32

## 2022-08-09 RX ADMIN — PROCHLORPERAZINE EDISYLATE 5 MG: 5 INJECTION INTRAMUSCULAR; INTRAVENOUS at 10:30

## 2022-08-09 RX ADMIN — DIPHENHYDRAMINE HYDROCHLORIDE 25 MG: 50 INJECTION INTRAMUSCULAR; INTRAVENOUS at 10:29

## 2022-08-09 NOTE — ED PROVIDER NOTES
Subjective   Rafa Baez is a 37 yr old male that presents to the ER with complaints of a headache.  Patient reports that he has had head pain all over his head.  Pain started around 4 PM yesterday and's been constant.  He denies any fevers or chills.  Negative for nausea or vomiting.  Patient reports that he is sensitive to light.  He complains of dizziness.  Pain increases with movement of his eyes.      History provided by:  Patient   used: No    Headache  Pain location:  Generalized  Radiates to:  Does not radiate  Severity at highest:  8/10  Duration:  1 day  Timing:  Constant  Progression:  Worsening  Context: activity, bright light and loud noise    Associated symptoms: dizziness and photophobia    Associated symptoms: no fever, no nausea, no numbness, no tingling and no vomiting        Review of Systems   Constitutional: Negative for fever.   Eyes: Positive for photophobia.   Gastrointestinal: Negative for nausea and vomiting.   Neurological: Positive for dizziness and headaches. Negative for numbness.   All other systems reviewed and are negative.      No past medical history on file.    No Known Allergies    No past surgical history on file.    No family history on file.    Social History     Socioeconomic History   • Marital status:    Tobacco Use   • Smoking status: Never Smoker   • Smokeless tobacco: Current User     Types: Snuff   Substance and Sexual Activity   • Alcohol use: Yes     Comment: occasional   • Drug use: Never   • Sexual activity: Yes     Partners: Female           Objective   Physical Exam  Vitals and nursing note reviewed.   Constitutional:       General: He is in acute distress.      Appearance: Normal appearance. He is well-developed. He is not toxic-appearing.      Comments: Patient is lying in a dark room.  Hand over his head.   HENT:      Head: Normocephalic and atraumatic.      Nose: Nose normal.      Mouth/Throat:      Mouth: Mucous membranes are  moist.   Eyes:      General: Lids are normal.      Extraocular Movements: Extraocular movements intact.      Conjunctiva/sclera: Conjunctivae normal.      Pupils: Pupils are equal, round, and reactive to light.   Neck:      Trachea: Trachea normal.   Cardiovascular:      Rate and Rhythm: Regular rhythm.      Pulses: Normal pulses.      Heart sounds: Normal heart sounds.   Pulmonary:      Effort: Pulmonary effort is normal. No respiratory distress.      Breath sounds: Normal breath sounds. No decreased breath sounds, wheezing, rhonchi or rales.   Abdominal:      General: Bowel sounds are normal.      Palpations: Abdomen is soft.      Tenderness: There is no abdominal tenderness.   Musculoskeletal:         General: Normal range of motion.      Cervical back: Full passive range of motion without pain and normal range of motion.   Skin:     General: Skin is warm and dry.      Findings: No rash.   Neurological:      Mental Status: He is alert and oriented to person, place, and time.      Cranial Nerves: No cranial nerve deficit.   Psychiatric:         Speech: Speech normal.         Behavior: Behavior normal. Behavior is cooperative.         Procedures           ED Course  ED Course as of 08/09/22 1152   Tue Aug 09, 2022   1119 Patient is feeling much better at this time.  Patient will be discharged home.  Patient to follow-up with primary care physician.  Patient agrees with treatment plan and verbalized understanding. [KG]      ED Course User Index  [KG] Pricila Butcher, APRN           Recent Results (from the past 24 hour(s))   COVID-19 and FLU A/B PCR - Swab, Nasopharynx    Collection Time: 08/09/22  9:37 AM    Specimen: Nasopharynx; Swab   Result Value Ref Range    COVID19 Not Detected Not Detected - Ref. Range    Influenza A PCR Not Detected Not Detected    Influenza B PCR Not Detected Not Detected   Green Top (Gel)    Collection Time: 08/09/22 10:28 AM   Result Value Ref Range    Extra Tube Hold for add-ons.   "  Lavender Top    Collection Time: 08/09/22 10:28 AM   Result Value Ref Range    Extra Tube hold for add-on    Gold Top - SST    Collection Time: 08/09/22 10:28 AM   Result Value Ref Range    Extra Tube Hold for add-ons.    Light Blue Top    Collection Time: 08/09/22 10:28 AM   Result Value Ref Range    Extra Tube Hold for add-ons.      Note: In addition to lab results from this visit, the labs listed above may include labs taken at another facility or during a different encounter within the last 24 hours. Please correlate lab times with ED admission and discharge times for further clarification of the services performed during this visit.    CT Head Without Contrast   Final Result   No evidence of hemorrhage, mass effect or midline shift. No acute   process identified.       This report was finalized on 8/9/2022 9:58 AM by Letitia Robison MD.            Vitals:    08/09/22 0911 08/09/22 1000 08/09/22 1100   BP: 134/83 113/64 129/72   BP Location: Left arm     Patient Position: Sitting     Pulse: 94 75    Resp: 18 18    Temp: 98.8 °F (37.1 °C)     TempSrc: Oral     SpO2: 96% 95% 94%   Weight: 101 kg (223 lb)     Height: 182.9 cm (72\")       Medications   sodium chloride 0.9 % flush 10 mL (has no administration in time range)   sodium chloride 0.9 % flush 10 mL (has no administration in time range)   sodium chloride 0.9 % bolus 1,000 mL (0 mL Intravenous Stopped 8/9/22 1124)   ketorolac (TORADOL) injection 15 mg (15 mg Intravenous Given 8/9/22 1032)   prochlorperazine (COMPAZINE) injection 5 mg (5 mg Intravenous Given 8/9/22 1030)   diphenhydrAMINE (BENADRYL) injection 25 mg (25 mg Intravenous Given 8/9/22 1029)     ECG/EMG Results (last 24 hours)     ** No results found for the last 24 hours. **        No orders to display                                       MDM    Final diagnoses:   Nonintractable headache, unspecified chronicity pattern, unspecified headache type       ED Disposition  ED Disposition     ED " Disposition   Discharge    Condition   Stable    Comment   --             [facility] Emergency Department  [address] [address] [phone]    As needed         Medication List      Stop    diclofenac 50 MG EC tablet  Commonly known as: Pricila Mota, HOPE  08/09/22 1159

## 2022-08-09 NOTE — DISCHARGE INSTRUCTIONS
Go home rest.  He continued to have issues follow-up with primary care physician.  Return to the ER.

## 2022-08-31 ENCOUNTER — OFFICE VISIT (OUTPATIENT)
Dept: NEUROLOGY | Facility: CLINIC | Age: 37
End: 2022-08-31

## 2022-08-31 VITALS
HEART RATE: 87 BPM | DIASTOLIC BLOOD PRESSURE: 78 MMHG | BODY MASS INDEX: 30.2 KG/M2 | SYSTOLIC BLOOD PRESSURE: 122 MMHG | HEIGHT: 72 IN | WEIGHT: 223 LBS | OXYGEN SATURATION: 97 %

## 2022-08-31 DIAGNOSIS — G43.509 PERSISTENT MIGRAINE AURA WITHOUT CEREBRAL INFARCTION AND WITHOUT STATUS MIGRAINOSUS, NOT INTRACTABLE: Primary | ICD-10-CM

## 2022-08-31 DIAGNOSIS — M54.2 NECK PAIN: ICD-10-CM

## 2022-08-31 PROCEDURE — 99204 OFFICE O/P NEW MOD 45 MIN: CPT | Performed by: NURSE PRACTITIONER

## 2022-08-31 RX ORDER — AMITRIPTYLINE HYDROCHLORIDE 10 MG/1
10-20 TABLET, FILM COATED ORAL NIGHTLY
Qty: 60 TABLET | Refills: 3 | Status: SHIPPED | OUTPATIENT
Start: 2022-08-31 | End: 2022-11-30

## 2022-08-31 RX ORDER — SUMATRIPTAN 50 MG/1
TABLET, FILM COATED ORAL
COMMUNITY
Start: 2022-08-18

## 2022-08-31 RX ORDER — SERTRALINE HYDROCHLORIDE 100 MG/1
100 TABLET, FILM COATED ORAL DAILY
COMMUNITY

## 2022-08-31 NOTE — PROGRESS NOTES
"   Neuro Office Visit      Encounter Date: 2022   Patient Name: Rafa Baez  : 1985   MRN: [id number]   PCP:  Provider, No Known     Chief Complaint:    Chief Complaint   Patient presents with   • Migraines     NP- constant pressure       History of Present Illness: Rafa Baez is a 37 y.o. male who is here today in Neurology for headaches.     He was seen in the ED at [facility] on 2022 for migraine headache. H/A resolved with administration of Toradol, Compazine, and Benadryl. Started on  and kept building and building. Developed nausea and dizziness. Triptans have not been effective for him. Also has \"knots in neck\". Wife massaged his neck which gave him some relief.    Headache Symptoms:   Days per month: Constant since   Location: Holocranial and Occiput       Quality: Pressure        Duration: Constant unless doing something strenuous  Severity: 3-5 but occasionally up to 10  Triggers: Jarring motions, shaking head fast  Associated Symptoms: Nausea, Vomiting, Photophobia, Phonophobia and Dizziness  Aura: Pressure sensation in occiput  Visual Changes: Blurred vision, black spots lasting 5-10 minutes eases off when lying down    Abortives: Sumatriptan  Preventives: Massage therapy                                                                              Previous Treatments: Triptans,  Excedrin Migraine, Ibuprofen    Subjective          Past Medical History:   Past Medical History:   Diagnosis Date   • Anxiety    • Bipolar 1 disorder (HCC)    • Depression    • Migraine    • PTSD (post-traumatic stress disorder)    • TBI (traumatic brain injury) (HCC)        Past Surgical History: History reviewed. No pertinent surgical history.    Family History:   Family History   Problem Relation Age of Onset   • Seizures Mother        Social History:   Social History     Socioeconomic History   • Marital status:    Tobacco Use   • Smoking status: Never Smoker   • Smokeless tobacco: Current User " "    Types: Snuff   Vaping Use   • Vaping Use: Never used   Substance and Sexual Activity   • Alcohol use: Yes     Comment: occasional   • Drug use: Never   • Sexual activity: Yes     Partners: Female       Medications:     Current Outpatient Medications:   •  sertraline (ZOLOFT) 100 MG tablet, Take 100 mg by mouth Daily., Disp: , Rfl:   •  SUMAtriptan (IMITREX) 50 MG tablet, TAKE TABLETS BY MOUTH AS DIRECTED TAKE 1 TABLET AT HEADACHE ONSET, MAY REPEAT IN TWO HOURS IF NEEDED, Disp: , Rfl:   •  amitriptyline (ELAVIL) 10 MG tablet, Take 1-2 tablets by mouth Every Night., Disp: 60 tablet, Rfl: 3    Allergies:   No Known Allergies    PHQ-9 Total Score:     STEADI Fall Risk Assessment has not been completed.    Objective     Physical Exam:   Physical Exam  Musculoskeletal:      Cervical back: Tenderness (Occipital area) present.   Neurological:      Mental Status: He is oriented to person, place, and time.      Gait: Gait is intact.      Deep Tendon Reflexes: Strength normal.         Neurologic Exam     Mental Status   Oriented to person, place, and time.   Level of consciousness: alert    Motor Exam   Muscle bulk: normal  Overall muscle tone: normal    Strength   Strength 5/5 throughout.     Sensory Exam   Light touch normal.     Gait, Coordination, and Reflexes     Gait  Gait: normal       Vital Signs:   Vitals:    08/31/22 0947   BP: 122/78   Pulse: 87   SpO2: 97%   Weight: 101 kg (223 lb)   Height: 182.9 cm (72.01\")     Body mass index is 30.24 kg/m².     Results:   Imaging:   CT Head Without Contrast    Result Date: 8/9/2022  No evidence of hemorrhage, mass effect or midline shift. No acute process identified.  This report was finalized on 8/9/2022 9:58 AM by Letitia Robison MD.         Labs:    No results found for: CMP, PROTEIN, ANTIMOGAB, [id number], JCVRESULT, QUANTTBGOLD, CBCDIF, IGGALBSER     Assessment / Plan      Assessment/Plan:   Diagnoses and all orders for this visit:    1. 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 Neck pain  Comments:  Physical therapy to evaluate and treat    Other orders  -     amitriptyline (ELAVIL) 10 MG tablet; Take 1-2 tablets by mouth Every Night.  Dispense: 60 tablet; Refill: 3           Patient Education:   I have discussed with the patient today the causes and overview of headaches. We discussed the different types of headaches to include tension-type headaches, Migraine headaches and Cluster headaches. We also discussed when headaches could or would be of a more serious condition such as brain infection, inflammation or bleeding within or around the brain. When to seek immediate medical attention or call 911.       Follow Up:   Return in about 3 months (around 11/30/2022).    During this visit the following were done:  Labs Reviewed []    Labs Ordered []    Radiology Reports Reviewed [x]    Radiology Ordered []    PCP Records Reviewed []    Referring Provider Records Reviewed []    ER Records Reviewed []    Hospital Records Reviewed []    History Obtained From Family []    Radiology Images Reviewed []    Other Reviewed []    Records Requested []      Time Spent: 40 minutes    HOPE Gao  [facility] NEUR[facility]  [address]  [place] [address] [phone]

## 2022-09-02 ENCOUNTER — SPECIALTY PHARMACY (OUTPATIENT)
Dept: ONCOLOGY | Facility: HOSPITAL | Age: 37
End: 2022-09-02

## 2022-09-02 ENCOUNTER — DOCUMENTATION (OUTPATIENT)
Dept: PHARMACY | Facility: HOSPITAL | Age: 37
End: 2022-09-02

## 2022-09-02 PROBLEM — G43.509: Status: ACTIVE | Noted: 2022-09-02

## 2022-09-02 RX ORDER — RIMEGEPANT SULFATE 75 MG/75MG
75 TABLET, ORALLY DISINTEGRATING ORAL DAILY PRN
Qty: 8 TABLET | Refills: 11 | Status: SHIPPED | OUTPATIENT
Start: 2022-09-02

## 2022-09-02 NOTE — PROGRESS NOTES
Specialty Pharmacy Patient Management Program  Neurology Initial Assessment     Rafa Baez is a 37 y.o. male with migraines seen by a Neurology provider and enrolled in the Neurology Patient Management program offered by [facility].  An initial outreach was conducted, including assessment of therapy appropriateness and specialty medication education for [facility]. The patient was introduced to services offered by [facility], including: regular assessments, refill coordination, curbside pick-up or mail order delivery options, prior authorization maintenance, and financial assistance programs as applicable. The patient was also provided with contact information for the pharmacy team.     Insurance Coverage & Financial Support  [facility]    Relevant Past Medical History and Comorbidities  Relevant medical history and concomitant health conditions were discussed with the patient. The patient's chart has been reviewed for relevant past medical history and comorbid health conditions and updated as necessary.   Past Medical History:   Diagnosis Date   • Anxiety    • Bipolar 1 disorder (HCC)    • Depression    • Migraine    • PTSD (post-traumatic stress disorder)    • TBI (traumatic brain injury) (HCC)      Social History     Socioeconomic History   • Marital status:    Tobacco Use   • Smoking status: Never Smoker   • Smokeless tobacco: Current User     Types: Snuff   Vaping Use   • Vaping Use: Never used   Substance and Sexual Activity   • Alcohol use: Yes     Comment: occasional   • Drug use: Never   • Sexual activity: Yes     Partners: Female       Problem list reviewed by Jeannie Sloan, PharmD on 9/2/2022 at 10:44 AM    Allergies  Known allergies and reactions were discussed with the patient. The patient's chart has been reviewed for  allergy information and updated as necessary.   Patient has no known allergies.         Current Medication List  This medication  list has been reviewed with the patient and evaluated for any interactions or necessary modifications/recommendations, and updated to include all prescription medications, OTC medications, and supplements the patient is currently taking.  This list reflects what is contained in the patient's profile, which has also been marked as reviewed to communicate to other providers it is the most up to date version of the patient's current medication therapy.     Current Outpatient Medications:   •  amitriptyline (ELAVIL) 10 MG tablet, Take 1-2 tablets by mouth Every Night., Disp: 60 tablet, Rfl: 3  •  Rimegepant Sulfate (Nurtec) 75 MG tablet dispersible tablet, Take 1 tablet by mouth Daily As Needed (migraines). Max of 75 mg/24 hrs, Max of 18 tablets/30days, Disp: 8 tablet, Rfl: 11  •  sertraline (ZOLOFT) 100 MG tablet, Take 100 mg by mouth Daily., Disp: , Rfl:   •  SUMAtriptan (IMITREX) 50 MG tablet, TAKE TABLETS BY MOUTH AS DIRECTED TAKE 1 TABLET AT HEADACHE ONSET, MAY REPEAT IN TWO HOURS IF NEEDED, Disp: , Rfl:   No current facility-administered medications for this visit.    Medicines reviewed by Jeannie Sloan, PharmD on 9/2/2022 at 10:39 AM    Drug Interactions  none     Recommended Medications Assessment    None      Relevant Laboratory Values        Initial Education Provided for Specialty Medication  The patient has been provided with the following education and any applicable administration techniques (i.e. self-injection) have been demonstrated for the therapies indicated. All questions and concerns have been addressed prior to the patient receiving the medication, and the patient has verbalized understanding of the education and any materials provided.  Additional patient education shall be provided and documented upon request by the patient, provider or payer.      Nurtec (rimegepant)  Medication Expectations   Why am I taking this medication? You are taking this medication for migraine prophylaxis or to treat  an acute migraine.   What should I expect while on this medication? You should expect to see a decrease in the frequency and severity of your migraines.   How does the medication work? Nurtec is a monoclonal antibody that binds to calcitonin gene-related peptide (CGRP) and blocks its binding to the receptor decreasing the severity of migraines.   How long will I be on this medication for? The amount of time you will be on this medication will be determined by your doctor and your response to the medication.    How do I take this medication? Take as directed on your prescription label.   What are some possible side effects? Potential side effects including, but not limited to nausea. Pt verbalized understanding.   What happens if I miss a dose? Take the missed dose as soon as possible, and resume the every other day timed from the last dose..     Medication Safety   What are things I should warn my doctor immediately about? Hypersensitivity reactions - trouble breathing or swallowing.   What are things that I should be cautious of? Hypersensitivity reactions (eg, dyspnea, rash), including delayed serious reactions, have occurred; discontinue use if suspected    What are some medications that can interact with this one? Avoid concomitant administration of Nurtec ODT with strong inhibitors of CY, strong or moderate inducers of CYP3A or inhibitors of P-gp or BCRP. Avoid another dose of Nurtec ODT within 48 hours when it is administered with moderate inhibitors of CY.  Ask your pharmacist or health care provider before starting new medications     Medication Storage/Handling   How should I handle this medication? Keep this medication out of reach of pets/children in original container. Ensure hands are dry before opening blister pack.   How does this medication need to be stored? Store at room temperature away from heat/cold, sunlight or moisture   How should I dispose of this medication? There should not be a  need to dispose of this medication unless your provider decides to change the dose or therapy. If that is the case, take to your local police station for proper disposal. Some pharmacies also have take-back bins for medication drop-off.      Resources/Support   How can I remind myself to take this medication? You can download reminder apps to help you manage your refills. You may also set an alarm on your phone to remind you. The pharmacy carries pill boxes that you can place next to an area you pass everyday (such as where you place your car keys or where you charge your phone)   Is financial support available?  Yes, [employer] can provide co-pay cards if you have commercial insurance or patient assistance if you have Medicare or no insurance.    Which vaccines are recommended for me? Talk to your doctor about these vaccines: Flu, Coronavirus (COVID-19), Pneumococcal (pneumonia), Tdap, Hepatitis B, Zoster (shingles)           Adherence and Self-Administration  • Barriers to Patient Adherence and/or Self-Administration: none    • Methods for Supporting Patient Adherence and/or Self-Administration: none     Goals of Therapy   Goals     • Specialty Pharmacy General Goal      Reduction in length and severity of migraines             Reassessment Plan & Follow-Up  1. Medication Therapy Changes: Start Nurtec 75 mg po once daily as needed for migraines (max of 75mg/24 hrs, max of 18 tab/30days)  2. Additional Plans, Therapy Recommendations, or Therapy Problems to Be Addressed: none  3. Pharmacist to perform regular reassessments no more than (6) months from the previous assessment.  4. Welcome information and patient satisfaction survey to be sent by retail team with patient's initial fill.  5. Care Coordinator to set up future refill outreaches, coordinate prescription delivery, and escalate clinical questions to pharmacist.     Attestation  I attest that the initiated specialty medication(s) are  appropriate for the patient based on my assessment.  If the prescribed therapy is at any point deemed not appropriate based on the current or future assessments, a consultation will be initiated with the patient's specialty care provider to determine the best course of action. The revised plan of therapy will be documented along with any additional patient education provided.     Jeannie Sloan, PharmD  9/2/2022  10:45 EDT

## 2022-09-02 NOTE — PROGRESS NOTES
Specialty Pharmacy Refill Coordination Note     Rafa is a 37 y.o. male contacted today regarding refills of  Nurtec specialty medication(s).    Reviewed and verified with patient:  Allergies  Meds  Problems       Specialty medication(s) and dose(s) confirmed: yes        Delivery Questions    Flowsheet Row Most Recent Value   Delivery method FedEx   Delivery address correct? Yes   Preferred delivery time? Anytime   Number of medications in delivery 1   Medication being filled and delivered Nurtec   Doses left of specialty medications New Start   Is there any medication that is due not being filled? No   Supplies needed? No supplies needed   Cooler needed? No   Do any medications need mixed or dated? No   Copay form of payment Payment plan already set up   Questions or concerns for the pharmacist? No   Are any medications first time fills? Yes                 Follow-up: 1 month(s)     Yvonne Chen, Pharmacy Technician  Specialty Pharmacy Technician

## 2022-09-29 ENCOUNTER — SPECIALTY PHARMACY (OUTPATIENT)
Dept: ONCOLOGY | Facility: HOSPITAL | Age: 37
End: 2022-09-29

## 2022-09-29 NOTE — PROGRESS NOTES
Specialty Pharmacy Refill Coordination Note     Rafa is a 37 y.o. male contacted today regarding refills of Nurtec specialty medication(s).    Reviewed and verified with patient: Yes  Specialty medication(s) and dose(s) confirmed: yes    Refill Questions    Flowsheet Row Most Recent Value   Changes to allergies? No   Changes to medications? No   New conditions since last clinic visit No   Unplanned office visit, urgent care, ED, or hospital admission in the last 4 weeks  No   How does patient/caregiver feel medication is working? Very good   Financial problems or insurance changes  No   Since the previous refill, were any specialty medication doses or scheduled injections missed or delayed?  No   Does this patient require a clinical escalation to a pharmacist? No          Delivery Questions    Flowsheet Row Most Recent Value   Delivery method FedEx   Delivery address correct? Yes   Preferred delivery time? Anytime   Number of medications in delivery 1   Medication being filled and delivered Nurtec   Doses left of specialty medications About 2 tablets. PRN medication   Is there any medication that is due not being filled? No   Supplies needed? No supplies needed   Cooler needed? No   Do any medications need mixed or dated? No   Copay form of payment Credit card on file   Questions or concerns for the pharmacist? No   Are any medications first time fills? No                 Follow-up:  28 days     Kadi Horne, Pharmacy Technician  Specialty Pharmacy Technician

## 2022-10-05 ENCOUNTER — TELEPHONE (OUTPATIENT)
Dept: NEUROLOGY | Facility: CLINIC | Age: 37
End: 2022-10-05

## 2022-10-05 NOTE — TELEPHONE ENCOUNTER
Provider: ISELA URIBE  Caller: SAMIA  Relationship to Patient: SELF  Pharmacy: N/A  Phone Number: [phone]  Reason for Call: PATIENT IS WANTING TO KNOW IF THERE IS SOMETHING ELSE HE CAN DO OR FILE FOR MIGRAINES. PATIENT'S EMPLOYER IS TRYING TO WORK AROUND THE INTERMITTENT FMLA TO THEIR ADVANTAGE AND NOT HIS.    PLEASE ADVISE    THANK YOU   When was the patient last seen: 08/31/2022

## 2022-10-05 NOTE — TELEPHONE ENCOUNTER
Spoke with patient and scheduled an appointment for 10/07 to bring in paperwork to be filled out for short term disability.

## 2022-10-06 NOTE — PROGRESS NOTES
"   Neuro Office Visit      Encounter Date: 10/07/2022   Patient Name: Rafa Baez  : 1985   MRN: [id number]   PCP:  Provider, No Known     Chief Complaint:    Chief Complaint   Patient presents with   • Migraine       History of Present Illness: Rafa Baez is a 37 y.o. male who is here today in Neurology for migraines.    Last visit with me on 22 started on elavil and Nurtec. Referred to PT.     Has been to PT and symptoms have slightly improved with muscle group therapy including cupping. He has a few hours of releif after PT.     He can feel the migraine starting and it seems to spread up his back and neck into his head.     Back hurts throughout back.     He feels that if he can get the muscle group in his thoracic area \"calmed down\" that he would not have migraines.     Elavil was not helpful.     Headache Symptoms:   Days per month: 11  Location: Holocranial and Occiput       Quality: Pressure        Duration: Constant  Severity: 3-5 occasionally 10  Triggers: Jarring motion, back pain  Associated Symptoms: Nausea, Vomiting, Photophobia, Phonophobia and Dizziness  Aura: Pressure building from neck/back  Visual Changes: Blurred vision, black spots    Abortives: Nurtec  Preventives: Elavil                                                                            Previous Treatments: Triptans, Exedrine Migraine, IBU, Massage therapy, Cupping, PT    Subjective          Past Medical History:   Past Medical History:   Diagnosis Date   • Anxiety    • Bipolar 1 disorder (HCC)    • Depression    • Migraine    • PTSD (post-traumatic stress disorder)    • TBI (traumatic brain injury)        Past Surgical History: No past surgical history on file.    Family History:   Family History   Problem Relation Age of Onset   • Seizures Mother        Social History:   Social History     Socioeconomic History   • Marital status:    Tobacco Use   • Smoking status: Never Smoker   • Smokeless tobacco: Current " User     Types: Snuff   Vaping Use   • Vaping Use: Never used   Substance and Sexual Activity   • Alcohol use: Yes     Comment: occasional   • Drug use: Never   • Sexual activity: Yes     Partners: Female       Medications:     Current Outpatient Medications:   •  amitriptyline (ELAVIL) 10 MG tablet, Take 1-2 tablets by mouth Every Night., Disp: 60 tablet, Rfl: 3  •  Rimegepant Sulfate (Nurtec) 75 MG tablet dispersible tablet, Take 1 tablet by mouth Daily As Needed (migraines). Max of 75 mg/24 hrs, Max of 18 tablets/30days, Disp: 8 tablet, Rfl: 11  •  sertraline (ZOLOFT) 100 MG tablet, Take 100 mg by mouth Daily., Disp: , Rfl:   •  SUMAtriptan (IMITREX) 50 MG tablet, TAKE TABLETS BY MOUTH AS DIRECTED TAKE 1 TABLET AT HEADACHE ONSET, MAY REPEAT IN TWO HOURS IF NEEDED, Disp: , Rfl:   •  cyclobenzaprine (FLEXERIL) 10 MG tablet, Take 0.5 tablets by mouth Every 8 (Eight) Hours As Needed for Muscle Spasms., Disp: 30 tablet, Rfl: 1    Allergies:   No Known Allergies    PHQ-9 Total Score:     STEADI Fall Risk Assessment has not been completed.    Objective     Physical Exam:   Physical Exam  Vitals reviewed.   Eyes:      Extraocular Movements: EOM normal.   Neurological:      Mental Status: He is oriented to person, place, and time.      Gait: Gait is intact.   Psychiatric:         Speech: Speech normal.         Neurologic Exam     Mental Status   Oriented to person, place, and time.   Attention: normal. Concentration: normal.   Speech: speech is normal   Level of consciousness: alert    Cranial Nerves     CN II   Visual fields full to confrontation.     CN III, IV, VI   Extraocular motions are normal.   Right pupil: Size: 4 mm. Shape: regular. Reactivity: brisk.   Left pupil: Size: 4 mm. Shape: regular. Reactivity: brisk.   CN III: no CN III palsy  CN VI: no CN VI palsy  Nystagmus: none     CN V   Facial sensation intact.     CN VII   Facial expression full, symmetric.     CN VIII   CN VIII normal.     CN IX, X   CN IX  "normal.   CN X normal.     CN XI   CN XI normal.     CN XII   CN XII normal.     Motor Exam     Strength   Right neck flexion: 5/5  Left neck flexion: 5/5  Right neck extension: 5/5  Left neck extension: 5/5  Right deltoid: 5/5  Left deltoid: 5/5  Right biceps: 5/5  Left biceps: 5/5  Right triceps: 5/5  Left triceps: 5/5  Right wrist flexion: 5/5  Left wrist flexion: 5/5  Right wrist extension: 5/5  Left wrist extension: 5/5  Right interossei: 5/5  Left interossei: 5/5  Right abdominals: 5/5  Left abdominals: 5/5  Right iliopsoas: 5/5  Left iliopsoas: 5/5  Right quadriceps: 5/5  Left quadriceps: 5/5  Right hamstrin/5  Left hamstrin/5  Right glutei: 5/5  Left glutei: 5/5  Right anterior tibial: 5/5  Left anterior tibial: 5/5  Right posterior tibial: 5/5  Left posterior tibial: 5/5  Right peroneal: 5/5  Left peroneal: 5/5  Right gastroc: 5/5  Left gastroc: 5/5    Sensory Exam   Light touch normal.     Gait, Coordination, and Reflexes     Gait  Gait: normal       Vital Signs:   Vitals:    10/07/22 0839   BP: 118/64   Pulse: 95   Temp: 97.5 °F (36.4 °C)   SpO2: 98%   Weight: 103 kg (226 lb 6.4 oz)   Height: 182.9 cm (72.01\")     Body mass index is 30.7 kg/m².     Results:   Imaging:   CT Head Without Contrast    Result Date: 2022  No evidence of hemorrhage, mass effect or midline shift. No acute process identified.  This report was finalized on 2022 9:58 AM by Letitia Robison MD.         Labs:    No results found for: CMP, PROTEIN, ANTIMOGAB, [id number], JCVRESULT, QUANTTBGOLD, CBCDIF, IGGALBSER     Assessment / Plan      Assessment/Plan:   Diagnoses and all orders for this visit:    1. Neck pain (Primary)  Comments:  MRI C-spine  Orders:  -     MRI Cervical Spine With & Without Contrast; Future    2. Acute right-sided thoracic back pain  Comments:  MRI spine  Orders:  -     MRI Thoracic Spine With & Without Contrast; Future    3. 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 Chronic bilateral low back pain without sciatica  Comments:  MRI lumbar spine  Orders:  -     MRI Lumbar Spine With Contrast; Future    Other orders  -     cyclobenzaprine (FLEXERIL) 10 MG tablet; Take 0.5 tablets by mouth Every 8 (Eight) Hours As Needed for Muscle Spasms.  Dispense: 30 tablet; Refill: 1           Patient Education:     Reviewed medications, potential side effects and signs and symptoms to report. Discussed risk versus benefits of treatment plan with patient and/or family-including medications, labs and radiology that may be ordered. Addressed questions and concerns during visit. Patient and/or family verbalized understanding and agree with plan. Instructed to call the office with any questions and report to ER with any life-threatening symptoms.     Follow Up:   Return in about 3 months (around 1/7/2023).    I spent 30  minutes face to face with the patient. I personally spent 50 percent of this time counseling and discussing diagnosis, diagnostic testing, driving, treatment options and management .       During this visit the following were done:  Labs Reviewed []    Labs Ordered []    Radiology Reports Reviewed []    Radiology Ordered [x]    PCP Records Reviewed []    Referring Provider Records Reviewed []    ER Records Reviewed []    Hospital Records Reviewed []    History Obtained From Family []    Radiology Images Reviewed []    Other Reviewed []    Records Requested []      HOPE Gao  [facility]  [address] [place] [address]  [facility] [id number] [phone]

## 2022-10-07 ENCOUNTER — OFFICE VISIT (OUTPATIENT)
Dept: NEUROLOGY | Facility: CLINIC | Age: 37
End: 2022-10-07

## 2022-10-07 VITALS
HEIGHT: 72 IN | HEART RATE: 95 BPM | OXYGEN SATURATION: 98 % | BODY MASS INDEX: 30.66 KG/M2 | SYSTOLIC BLOOD PRESSURE: 118 MMHG | TEMPERATURE: 97.5 F | WEIGHT: 226.4 LBS | DIASTOLIC BLOOD PRESSURE: 64 MMHG

## 2022-10-07 DIAGNOSIS — G89.29 CHRONIC BILATERAL LOW BACK PAIN WITHOUT SCIATICA: ICD-10-CM

## 2022-10-07 DIAGNOSIS — M54.2 NECK PAIN: Primary | ICD-10-CM

## 2022-10-07 DIAGNOSIS — G43.509 PERSISTENT MIGRAINE AURA WITHOUT CEREBRAL INFARCTION AND WITHOUT STATUS MIGRAINOSUS, NOT INTRACTABLE: ICD-10-CM

## 2022-10-07 DIAGNOSIS — M54.6 ACUTE RIGHT-SIDED THORACIC BACK PAIN: ICD-10-CM

## 2022-10-07 DIAGNOSIS — M54.50 CHRONIC BILATERAL LOW BACK PAIN WITHOUT SCIATICA: ICD-10-CM

## 2022-10-07 PROCEDURE — 99213 OFFICE O/P EST LOW 20 MIN: CPT | Performed by: NURSE PRACTITIONER

## 2022-10-07 RX ORDER — CYCLOBENZAPRINE HCL 10 MG
5 TABLET ORAL EVERY 8 HOURS PRN
Qty: 30 TABLET | Refills: 1 | Status: SHIPPED | OUTPATIENT
Start: 2022-10-07 | End: 2022-11-30 | Stop reason: SDUPTHER

## 2022-10-24 ENCOUNTER — TELEPHONE (OUTPATIENT)
Dept: NEUROLOGY | Facility: CLINIC | Age: 37
End: 2022-10-24

## 2022-10-24 NOTE — TELEPHONE ENCOUNTER
Caller: Rafa Baez    Relationship: Self    Best call back number: [phone]    What is the best time to reach you: ANY    Who are you requesting to speak with (clinical staff, provider,  specific staff member): RONY    What was the call regarding: PATIENT TELEPHONE RE: SHORT TERM DISABILITY FORMS. HE STATES IT WAS DENIED DUE TO A COMMENT MADE ON THE PAPERWORK CLAIMING IT MAYBE CAUSED BY WORK. THEY DENIED IT THINKING THIS IS A WORKMANS COMP CLAIM INSTEAD. HE WOULD LIKE TO SPEAK TO MD TO CLARIFY INFORMATION ON THE FORM & TO DISCUSS CONCERNS.    PLEASE CALL & ADVISE    THANK YOU

## 2022-10-26 ENCOUNTER — SPECIALTY PHARMACY (OUTPATIENT)
Dept: ONCOLOGY | Facility: HOSPITAL | Age: 37
End: 2022-10-26

## 2022-10-26 NOTE — PROGRESS NOTES
Specialty Pharmacy Refill Coordination Note     Rafa is a 37 y.o. male contacted today regarding refills of  Qulipta and Nurtec specialty medication(s).    Reviewed and verified with patient:  Allergies  Meds  Problems       Specialty medication(s) and dose(s) confirmed: yes    Refill Questions    Flowsheet Row Most Recent Value   Changes to allergies? No   Changes to medications? Yes  [Patient has started Qulipta]   New conditions since last clinic visit No   Unplanned office visit, urgent care, ED, or hospital admission in the last 4 weeks  No   How does patient/caregiver feel medication is working? Very good   Financial problems or insurance changes  No   Since the previous refill, were any specialty medication doses or scheduled injections missed or delayed?  No   Does this patient require a clinical escalation to a pharmacist? No  [[facility] did assessment for new Qulipta start]          Delivery Questions    Flowsheet Row Most Recent Value   Delivery method FedEx   Delivery address correct? Yes   Preferred delivery time? Anytime   Number of medications in delivery 2   Medication being filled and delivered Nurtec and Qulipta   Doses left of specialty medications Qulipta=New Start   Is there any medication that is due not being filled? No   Supplies needed? No supplies needed   Cooler needed? No   Copay form of payment Payment plan already set up   Questions or concerns for the pharmacist? No   Are any medications first time fills? Yes  [Qulipta]                 Follow-up: 1 month(s)     Yvonne Chen, Pharmacy Technician  Specialty Pharmacy Technician

## 2022-10-26 NOTE — PROGRESS NOTES
Specialty Pharmacy Patient Management Program  Neurology Initial and reassessment     Rafa Baez is a 37 y.o. male with migraines seen by a Neurology provider and enrolled in the Neurology Patient Management program offered by [facility].  An initial outreach was conducted, including assessment of therapy appropriateness and specialty medication education for Qulipta and continuation of Nurtec. The patient was introduced to services offered by [facility], including: regular assessments, refill coordination, curbside pick-up or mail order delivery options, prior authorization maintenance, and financial assistance programs as applicable. The patient was also provided with contact information for the pharmacy team.     Insurance Coverage & Financial Support  [facility]    Relevant Past Medical History and Comorbidities  Relevant medical history and concomitant health conditions were discussed with the patient. The patient's chart has been reviewed for relevant past medical history and comorbid health conditions and updated as necessary.   Past Medical History:   Diagnosis Date   • Anxiety    • Bipolar 1 disorder (HCC)    • Depression    • Migraine    • PTSD (post-traumatic stress disorder)    • TBI (traumatic brain injury)      Social History     Socioeconomic History   • Marital status:    Tobacco Use   • Smoking status: Never   • Smokeless tobacco: Current     Types: Snuff   Vaping Use   • Vaping Use: Never used   Substance and Sexual Activity   • Alcohol use: Yes     Comment: occasional   • Drug use: Never   • Sexual activity: Yes     Partners: Female       Problem list reviewed by Miriam Armenta, PharmD on 10/26/2022 at  3:30 PM    Allergies  Known allergies and reactions were discussed with the patient. The patient's chart has been reviewed for  allergy information and updated as necessary.   Patient has no known allergies.    Allergies reviewed by  Miriam Armenta PharmD on 10/26/2022 at  3:29 PM    Current Medication List  This medication list has been reviewed with the patient and evaluated for any interactions or necessary modifications/recommendations, and updated to include all prescription medications, OTC medications, and supplements the patient is currently taking.  This list reflects what is contained in the patient's profile, which has also been marked as reviewed to communicate to other providers it is the most up to date version of the patient's current medication therapy.     Current Outpatient Medications:   •  amitriptyline (ELAVIL) 10 MG tablet, Take 1-2 tablets by mouth Every Night., Disp: 60 tablet, Rfl: 3  •  Atogepant 60 MG tablet, Take 1 tablet by mouth Daily., Disp: 30 tablet, Rfl: 11  •  cyclobenzaprine (FLEXERIL) 10 MG tablet, Take 0.5 tablets by mouth Every 8 (Eight) Hours As Needed for Muscle Spasms., Disp: 30 tablet, Rfl: 1  •  Rimegepant Sulfate (Nurtec) 75 MG tablet dispersible tablet, Take 1 tablet by mouth Daily As Needed (migraines). Max of 75 mg/24 hrs, Max of 18 tablets/30days, Disp: 8 tablet, Rfl: 11  •  sertraline (ZOLOFT) 100 MG tablet, Take 100 mg by mouth Daily., Disp: , Rfl:   •  SUMAtriptan (IMITREX) 50 MG tablet, TAKE TABLETS BY MOUTH AS DIRECTED TAKE 1 TABLET AT HEADACHE ONSET, MAY REPEAT IN TWO HOURS IF NEEDED, Disp: , Rfl:     Medicines reviewed by Miriam Armenta PharmD on 10/26/2022 at  3:30 PM  Medicines reviewed by Miriam Armenta PharmD on 10/26/2022 at  3:58 PM    Drug Interactions  None identified     Recommended Medications Assessment    None      Relevant Laboratory Values        Initial Education Provided for Specialty Medication  The patient has been provided with the following education and any applicable administration techniques (i.e. self-injection) have been demonstrated for the therapies indicated. All questions and concerns have been addressed prior to the patient receiving the  medication, and the patient has verbalized understanding of the education and any materials provided.  Additional patient education shall be provided and documented upon request by the patient, provider or payer.      Atogepant (Qulipta)           Medication Expectations   Why am I taking this medication? You are taking this medication for migraine prophylaxis.   What should I expect while on this medication? You should expect to a decrease in the frequency and severity of your migraines.   How does the medication work? Qulipta is a monoclonal antibody that binds to calcitonin gene-related peptide (CGRP) and blocks its binding to the receptor decreasing the severity of migraines.   How long will I be on this medication for? The amount of time you will be on this medication will be determined by your doctor and your response to the medication.    How do I take this medication? Take as directed on your prescription label.   Take one tablet daily with or without food.   What are some possible side effects? Potential side effects including, but not limited to nausea, constipation and fatigue. Pt verbalized understanding.   What happens if I miss a dose? If you miss a dose of this medicine, take it as soon as possible. However, if it is almost time for your next dose, skip the missed dose and go back to your regular dosing schedule. Do not double doses.                  Medication Safety   What are things I should warn my doctor immediately about? Hypersensitivity reactions, such as trouble breathing or swallowing.   What are things that I should be cautious of? Be cautious driving until you know how this drug will affect you.   What are some medications that can interact with this one? Ketoconazole, Itraconazole, cyclosporin, clarithromycin, rifampin, carbamazepine, phenytion, [place]'s Wort, efavirenz, and etravine.            Medication Storage/Handling   How should I handle this medication? Keep this medication our  of reach of pets/children in original container.   How does this medication need to be stored? Store at room temperature away from heat/cold, sunlight or moisture.   How should I dispose of this medication? There should not be a need to dispose of this medication unless your provider decides to change the dose or therapy. If that is the case, take to your local police station for proper disposal. Some pharmacies also have take-back bins for medication drop-off.             Resources/Support   How can I remind myself to take this medication? You can download reminder apps to help you manage your refills. You may also set an alarm on your phone to remind you. The pharmacy carries pill boxes that you can place next to an area you pass everyday (such as where you place your car keys or where you charge your phone)   Is financial support available?  Yes, [employer] can provide co-pay cards if you have commercial insurance or patient assistance if you have Medicare or no insurance.    Which vaccines are recommended for me? Talk to your doctor about these vaccines: Flu, Coronavirus (COVID-19), Pneumococcal (pneumonia), Tdap, Hepatitis B, Zoster (shingles)          Adherence and Self-Administration  • Barriers to Patient Adherence and/or Self-Administration: None    • Methods for Supporting Patient Adherence and/or Self-Administration: N/A      Adverse Drug Reactions  • Adverse Reactions Experienced: None  • Plan for ADR Management: N/A     Hospitalizations and Urgent Care Since Last Assessment  • Hospitalizations or Admissions: None   • ED Visits: None   • Urgent Office Visits: None       Quality of Life Assessment   Quality of Life related to the patient's specialty therapy was discussed with the patient. The QOL segment of this outreach has been reviewed and updated.     Quality of Life Assessment  Quality of Life Improvement Scale: Moderately better    Goals of Therapy   Goals     • Specialty Pharmacy General Goal       Reduction in length and severity of acute migraines             Reassessment Plan & Follow-Up  1. Medication Therapy Changes: Add Qulipta 60mg po daily and continue Nurtec 75mg po prn (max 75mg/24 hours).  2. Additional Plans, Therapy Recommendations, or Therapy Problems to Be Addressed: None   3. Pharmacist to perform regular reassessments no more than (6) months from the previous assessment.  4. Welcome information and patient satisfaction survey to be sent by retail team with patient's initial fill.  5. Care Coordinator to set up future refill outreaches, coordinate prescription delivery, and escalate clinical questions to pharmacist.     Attestation  I attest that the initiated specialty medication(s) are appropriate for the patient based on my assessment.  If the prescribed therapy is at any point deemed not appropriate based on the current or future assessments, a consultation will be initiated with the patient's specialty care provider to determine the best course of action. The revised plan of therapy will be documented along with any additional patient education provided.     Miriam Armenta, PharmD, MPA, BCPS, MSCS  10/26/2022  15:58 EDT

## 2022-11-16 ENCOUNTER — HOSPITAL ENCOUNTER (OUTPATIENT)
Dept: MRI IMAGING | Facility: HOSPITAL | Age: 37
Discharge: HOME | End: 2022-11-16

## 2022-11-16 DIAGNOSIS — G89.29 CHRONIC BILATERAL LOW BACK PAIN WITHOUT SCIATICA: ICD-10-CM

## 2022-11-16 DIAGNOSIS — M54.6 ACUTE RIGHT-SIDED THORACIC BACK PAIN: ICD-10-CM

## 2022-11-16 DIAGNOSIS — M54.2 NECK PAIN: ICD-10-CM

## 2022-11-16 DIAGNOSIS — M54.50 CHRONIC BILATERAL LOW BACK PAIN WITHOUT SCIATICA: ICD-10-CM

## 2022-11-16 PROCEDURE — 72156 MRI NECK SPINE W/O & W/DYE: CPT

## 2022-11-16 PROCEDURE — A9577 INJ MULTIHANCE: HCPCS | Performed by: NURSE PRACTITIONER

## 2022-11-16 PROCEDURE — 72158 MRI LUMBAR SPINE W/O & W/DYE: CPT

## 2022-11-16 PROCEDURE — 0 GADOBENATE DIMEGLUMINE 529 MG/ML SOLUTION: Performed by: NURSE PRACTITIONER

## 2022-11-16 PROCEDURE — 72157 MRI CHEST SPINE W/O & W/DYE: CPT

## 2022-11-16 RX ADMIN — GADOBENATE DIMEGLUMINE 20 ML: 529 INJECTION, SOLUTION INTRAVENOUS at 11:39

## 2022-11-22 ENCOUNTER — DOCUMENTATION (OUTPATIENT)
Dept: ONCOLOGY | Facility: HOSPITAL | Age: 37
End: 2022-11-22

## 2022-11-22 ENCOUNTER — TELEPHONE (OUTPATIENT)
Dept: NEUROLOGY | Facility: CLINIC | Age: 37
End: 2022-11-22

## 2022-11-22 NOTE — TELEPHONE ENCOUNTER
Caller: NestorAnkur montgomeryy    Relationship: Self    Best call back number: [phone]    Caller requesting test results: PATIENT    What test was performed: MRI    When was the test performed: 11/16/22    Where was the test performed: [facility]    Additional notes: PATIENT WOULD LIKE RESULTS OF THE 3 MRIS HE HAD PERFORMED ON 11/16/22.  PLEASE CALL WHEN RESULTS ARE AVAILABLE.     THANK YOU

## 2022-11-30 ENCOUNTER — OFFICE VISIT (OUTPATIENT)
Dept: NEUROLOGY | Facility: CLINIC | Age: 37
End: 2022-11-30

## 2022-11-30 VITALS
BODY MASS INDEX: 30.2 KG/M2 | WEIGHT: 223 LBS | SYSTOLIC BLOOD PRESSURE: 118 MMHG | TEMPERATURE: 97.1 F | DIASTOLIC BLOOD PRESSURE: 80 MMHG | HEIGHT: 72 IN

## 2022-11-30 DIAGNOSIS — G43.509 PERSISTENT MIGRAINE AURA WITHOUT CEREBRAL INFARCTION AND WITHOUT STATUS MIGRAINOSUS, NOT INTRACTABLE: ICD-10-CM

## 2022-11-30 DIAGNOSIS — M54.2 NECK PAIN: Primary | ICD-10-CM

## 2022-11-30 PROCEDURE — 99214 OFFICE O/P EST MOD 30 MIN: CPT | Performed by: NURSE PRACTITIONER

## 2022-11-30 RX ORDER — CYCLOBENZAPRINE HCL 10 MG
5 TABLET ORAL EVERY 8 HOURS PRN
Qty: 30 TABLET | Refills: 1 | Status: SHIPPED | OUTPATIENT
Start: 2022-11-30 | End: 2023-11-30

## 2022-11-30 NOTE — PROGRESS NOTES
Neuro Office Visit      Encounter Date: 2022   Patient Name: Rafa Baez  : 1985   MRN: [id number]   PCP:  Provider, No Known     Chief Complaint:    Chief Complaint   Patient presents with   • Neck Pain     Requesting return to work.       History of Present Illness: Rafa Baez is a 37 y.o. male who is here today in Neurology for headaches.     Last visit with me 10/7/2022, started on Qulipta, Nurtec continued.  MRIs of the C-spine, thoracic spine, and lumbar spine ordered    Neck pain  He has been to PT and had dry needling, cupping.  He feels much better and has very little back pain.  He continues to do physical therapy exercises at home.  He feels ready to try and go back to work. MRI imaging without concerning findings.    Headaches   Vastly improved.    He is only had 2 or 3 headaches next month.  He has stopped taking preventative medications and has not needed to use abortives.      Subjective        Past Medical History:   Past Medical History:   Diagnosis Date   • Anxiety    • Bipolar 1 disorder (HCC)    • Depression    • Migraine    • PTSD (post-traumatic stress disorder)    • TBI (traumatic brain injury)        Past Surgical History: No past surgical history on file.    Family History:   Family History   Problem Relation Age of Onset   • Seizures Mother        Social History:   Social History     Socioeconomic History   • Marital status:    Tobacco Use   • Smoking status: Never   • Smokeless tobacco: Current     Types: Snuff   Vaping Use   • Vaping Use: Never used   Substance and Sexual Activity   • Alcohol use: Yes     Comment: occasional   • Drug use: Never   • Sexual activity: Yes     Partners: Female       Medications:     Current Outpatient Medications:   •  cyclobenzaprine (FLEXERIL) 10 MG tablet, Take 0.5 tablets by mouth Every 8 (Eight) Hours As Needed for Muscle Spasms., Disp: 30 tablet, Rfl: 1  •  Atogepant 60 MG tablet, Take 1 tablet by mouth Daily., Disp: 30  tablet, Rfl: 11  •  Rimegepant Sulfate (Nurtec) 75 MG tablet dispersible tablet, Take 1 tablet by mouth Daily As Needed (migraines). Max of 75 mg/24 hrs, Max of 18 tablets/30days, Disp: 8 tablet, Rfl: 11  •  sertraline (ZOLOFT) 100 MG tablet, Take 100 mg by mouth Daily., Disp: , Rfl:   •  SUMAtriptan (IMITREX) 50 MG tablet, TAKE TABLETS BY MOUTH AS DIRECTED TAKE 1 TABLET AT HEADACHE ONSET, MAY REPEAT IN TWO HOURS IF NEEDED, Disp: , Rfl:     Allergies:   No Known Allergies    PHQ-9 Total Score:     STEADI Fall Risk Assessment has not been completed.    Objective     Physical Exam:   Physical Exam  Vitals reviewed.   Eyes:      Extraocular Movements: EOM normal.      Pupils: Pupils are equal, round, and reactive to light.   Neurological:      Mental Status: He is oriented to person, place, and time.      Gait: Gait is intact.      Deep Tendon Reflexes:      Reflex Scores:       Tricep reflexes are 2+ on the right side and 2+ on the left side.       Bicep reflexes are 2+ on the right side and 2+ on the left side.       Brachioradialis reflexes are 2+ on the right side and 2+ on the left side.       Patellar reflexes are 2+ on the right side and 2+ on the left side.       Achilles reflexes are 2+ on the right side and 2+ on the left side.  Psychiatric:         Speech: Speech normal.         Neurologic Exam     Mental Status   Oriented to person, place, and time.   Attention: normal.   Speech: speech is normal   Level of consciousness: alert  Knowledge: good.     Cranial Nerves     CN II   Visual fields full to confrontation.     CN III, IV, VI   Pupils are equal, round, and reactive to light.  Extraocular motions are normal.   CN III: no CN III palsy  CN VI: no CN VI palsy    CN V   Facial sensation intact.   Right facial sensation deficit: none  Left facial sensation deficit: none    CN VII   Facial expression full, symmetric.   Right facial weakness: none  Left facial weakness: none    CN VIII   CN VIII normal.     CN  "IX, X   CN IX normal.   CN X normal.     CN XI   CN XI normal.   Right sternocleidomastoid strength: normal  Left sternocleidomastoid strength: normal  Right trapezius strength: normal  Left trapezius strength: normal    CN XII   CN XII normal.     Motor Exam     Strength   Right neck flexion: 5/5  Left neck flexion: 5/5  Right neck extension: 5/5  Left neck extension: 5/5  Right deltoid: 5/5  Left deltoid: 5/5  Right biceps: 5/5  Left biceps: 5/5  Right triceps: 5/5  Left triceps: 5/5  Right wrist flexion: 5/5  Left wrist flexion: 5/5  Right wrist extension: 5/5  Left wrist extension: 5/5  Right interossei: 5/5  Left interossei: 5/5  Right abdominals: 5/5  Left abdominals: 5/5  Right iliopsoas: 5/5  Left iliopsoas: 5/5  Right quadriceps: 5/5  Left quadriceps: 5/5  Right hamstrin/5  Left hamstrin/5  Right glutei: 5/5  Left glutei: 5/5  Right anterior tibial: 5/5  Left anterior tibial: 5/5  Right posterior tibial: 5/5  Left posterior tibial: 5/5  Right peroneal: 5/5  Left peroneal: 5/5  Right gastroc: 5/5  Left gastroc: 5/5    Sensory Exam   Light touch normal.     Gait, Coordination, and Reflexes     Gait  Gait: normal    Reflexes   Right brachioradialis: 2+  Left brachioradialis: 2+  Right biceps: 2+  Left biceps: 2+  Right triceps: 2+  Left triceps: 2+  Right patellar: 2+  Left patellar: 2+  Right achilles: 2+  Left achilles: 2+  Right : 2+  Left : 2+       Vital Signs:   Vitals:    22 1018   BP: 118/80   Temp: 97.1 °F (36.2 °C)   Weight: 101 kg (223 lb)   Height: 182.9 cm (72.01\")   PainSc: 0-No pain     Body mass index is 30.24 kg/m².     Results:   Imaging:   CT Head Without Contrast    Result Date: 2022  No evidence of hemorrhage, mass effect or midline shift. No acute process identified.  This report was finalized on 2022 9:58 AM by Letitia Robison MD.      MRI Cervical Spine With & Without Contrast    Result Date: 2022  Essentially normal contrast-enhanced MRI of the cervical " and thoracic spine. The cervical and thoracic spinal cord is normal in caliber and signal. There is no abnormal enhancement. The spinal canal and neural foramina demonstrate no significant narrowing.  Contrast-enhanced MRI of the lumbar spine demonstrates faint enhancement of the cauda equina nerve roots. This is a nonspecific finding of uncertain clinical significance, with somewhat broad differential considerations including postoperative nerve root enhancement, arachnoiditis and demyelinating conditions such as Guillain [place] syndrome. Otherwise normal MRI of the lumbar spine.  This report was finalized on 11/16/2022 3:23 PM by Chong Bullard.      MRI Thoracic Spine With & Without Contrast    Result Date: 11/16/2022  Essentially normal contrast-enhanced MRI of the cervical and thoracic spine. The cervical and thoracic spinal cord is normal in caliber and signal. There is no abnormal enhancement. The spinal canal and neural foramina demonstrate no significant narrowing.  Contrast-enhanced MRI of the lumbar spine demonstrates faint enhancement of the cauda equina nerve roots. This is a nonspecific finding of uncertain clinical significance, with somewhat broad differential considerations including postoperative nerve root enhancement, arachnoiditis and demyelinating conditions such as Guillain Lemmon syndrome. Otherwise normal MRI of the lumbar spine.  This report was finalized on 11/16/2022 3:23 PM by Chong Bullard.      MRI Lumbar Spine With & Without Contrast    Result Date: 11/16/2022  Essentially normal contrast-enhanced MRI of the cervical and thoracic spine. The cervical and thoracic spinal cord is normal in caliber and signal. There is no abnormal enhancement. The spinal canal and neural foramina demonstrate no significant narrowing.  Contrast-enhanced MRI of the lumbar spine demonstrates faint enhancement of the cauda equina nerve roots. This is a nonspecific finding of uncertain clinical  significance, with somewhat broad differential considerations including postoperative nerve root enhancement, arachnoiditis and demyelinating conditions such as Guillain [place] syndrome. Otherwise normal MRI of the lumbar spine.  This report was finalized on 11/16/2022 3:23 PM by Chong Bullard.         Labs:    No results found for: CMP, PROTEIN, ANTIMOGAB, [id number], JCVRESULT, QUANTTBGOLD, CBCDIF, IGGALBSER     Assessment / Plan      Assessment/Plan:   Diagnoses and all orders for this visit:    1. Neck pain (Primary)    2. Persistent migraine aura without cerebral infarction and without status migrainosus, not intractable    Other orders  -     cyclobenzaprine (FLEXERIL) 10 MG tablet; Take 0.5 tablets by mouth Every 8 (Eight) Hours As Needed for Muscle Spasms.  Dispense: 30 tablet; Refill: 1           Patient Education:   He feels much improved and would like to try returning to work next week.    Reviewed medications, potential side effects and signs and symptoms to report. Discussed risk versus benefits of treatment plan with patient and/or family-including medications, labs and radiology that may be ordered. Addressed questions and concerns during visit. Patient and/or family verbalized understanding and agree with plan. Instructed to call the office with any questions and report to ER with any life-threatening symptoms.     Follow Up:   Return in about 6 months (around 5/30/2023).    I spent 30  minutes face to face with the patient. I personally spent 50 percent of this time counseling and discussing diagnosis, diagnostic testing, treatment options and management .       During this visit the following were done:  Labs Reviewed []    Labs Ordered []    Radiology Reports Reviewed [x]    Radiology Ordered []    PCP Records Reviewed []    Referring Provider Records Reviewed []    ER Records Reviewed []    Hospital Records Reviewed []    History Obtained From Family []    Radiology Images Reviewed [x]     Other Reviewed []    Records Requested []      HOPE Gao  E NEURO CENTER [facility] [place]  [address] [place] [facility] [address]  [place] [address] [phone]